# Patient Record
Sex: MALE | Race: WHITE | NOT HISPANIC OR LATINO | Employment: PART TIME | URBAN - METROPOLITAN AREA
[De-identification: names, ages, dates, MRNs, and addresses within clinical notes are randomized per-mention and may not be internally consistent; named-entity substitution may affect disease eponyms.]

---

## 2018-10-18 ENCOUNTER — HOSPITAL ENCOUNTER (EMERGENCY)
Facility: MEDICAL CENTER | Age: 28
End: 2018-10-18
Attending: EMERGENCY MEDICINE
Payer: MEDICAID

## 2018-10-18 VITALS
OXYGEN SATURATION: 98 % | SYSTOLIC BLOOD PRESSURE: 112 MMHG | TEMPERATURE: 98.1 F | HEART RATE: 59 BPM | HEIGHT: 70 IN | WEIGHT: 195.55 LBS | BODY MASS INDEX: 28 KG/M2 | DIASTOLIC BLOOD PRESSURE: 73 MMHG | RESPIRATION RATE: 16 BRPM

## 2018-10-18 DIAGNOSIS — R53.1 WEAKNESS: ICD-10-CM

## 2018-10-18 DIAGNOSIS — K62.5 BRBPR (BRIGHT RED BLOOD PER RECTUM): ICD-10-CM

## 2018-10-18 LAB
ALBUMIN SERPL BCP-MCNC: 4.4 G/DL (ref 3.2–4.9)
ALBUMIN/GLOB SERPL: 1.3 G/DL
ALP SERPL-CCNC: 61 U/L (ref 30–99)
ALT SERPL-CCNC: 31 U/L (ref 2–50)
ANION GAP SERPL CALC-SCNC: 3 MMOL/L (ref 0–11.9)
APTT PPP: 26.2 SEC (ref 24.7–36)
AST SERPL-CCNC: 25 U/L (ref 12–45)
BASOPHILS # BLD AUTO: 0.6 % (ref 0–1.8)
BASOPHILS # BLD: 0.05 K/UL (ref 0–0.12)
BILIRUB SERPL-MCNC: 0.4 MG/DL (ref 0.1–1.5)
BUN SERPL-MCNC: 25 MG/DL (ref 8–22)
CALCIUM SERPL-MCNC: 10.2 MG/DL (ref 8.5–10.5)
CHLORIDE SERPL-SCNC: 104 MMOL/L (ref 96–112)
CO2 SERPL-SCNC: 27 MMOL/L (ref 20–33)
CREAT SERPL-MCNC: 1.32 MG/DL (ref 0.5–1.4)
EOSINOPHIL # BLD AUTO: 0.19 K/UL (ref 0–0.51)
EOSINOPHIL NFR BLD: 2.3 % (ref 0–6.9)
ERYTHROCYTE [DISTWIDTH] IN BLOOD BY AUTOMATED COUNT: 42.9 FL (ref 35.9–50)
GLOBULIN SER CALC-MCNC: 3.3 G/DL (ref 1.9–3.5)
GLUCOSE SERPL-MCNC: 93 MG/DL (ref 65–99)
HCT VFR BLD AUTO: 48.5 % (ref 42–52)
HGB BLD-MCNC: 16.3 G/DL (ref 14–18)
IMM GRANULOCYTES # BLD AUTO: 0.02 K/UL (ref 0–0.11)
IMM GRANULOCYTES NFR BLD AUTO: 0.2 % (ref 0–0.9)
INR PPP: 0.96 (ref 0.87–1.13)
LYMPHOCYTES # BLD AUTO: 2.27 K/UL (ref 1–4.8)
LYMPHOCYTES NFR BLD: 27.7 % (ref 22–41)
MCH RBC QN AUTO: 29.6 PG (ref 27–33)
MCHC RBC AUTO-ENTMCNC: 33.6 G/DL (ref 33.7–35.3)
MCV RBC AUTO: 88 FL (ref 81.4–97.8)
MONOCYTES # BLD AUTO: 0.75 K/UL (ref 0–0.85)
MONOCYTES NFR BLD AUTO: 9.1 % (ref 0–13.4)
NEUTROPHILS # BLD AUTO: 4.92 K/UL (ref 1.82–7.42)
NEUTROPHILS NFR BLD: 60.1 % (ref 44–72)
NRBC # BLD AUTO: 0 K/UL
NRBC BLD-RTO: 0 /100 WBC
PLATELET # BLD AUTO: 269 K/UL (ref 164–446)
PMV BLD AUTO: 10.1 FL (ref 9–12.9)
POTASSIUM SERPL-SCNC: 4.4 MMOL/L (ref 3.6–5.5)
PROT SERPL-MCNC: 7.7 G/DL (ref 6–8.2)
PROTHROMBIN TIME: 12.9 SEC (ref 12–14.6)
RBC # BLD AUTO: 5.51 M/UL (ref 4.7–6.1)
SODIUM SERPL-SCNC: 134 MMOL/L (ref 135–145)
TSH SERPL DL<=0.005 MIU/L-ACNC: 2.26 UIU/ML (ref 0.38–5.33)
WBC # BLD AUTO: 8.2 K/UL (ref 4.8–10.8)

## 2018-10-18 PROCEDURE — 85730 THROMBOPLASTIN TIME PARTIAL: CPT

## 2018-10-18 PROCEDURE — 99284 EMERGENCY DEPT VISIT MOD MDM: CPT

## 2018-10-18 PROCEDURE — 85025 COMPLETE CBC W/AUTO DIFF WBC: CPT

## 2018-10-18 PROCEDURE — 80053 COMPREHEN METABOLIC PANEL: CPT

## 2018-10-18 PROCEDURE — 85610 PROTHROMBIN TIME: CPT

## 2018-10-18 PROCEDURE — 84443 ASSAY THYROID STIM HORMONE: CPT

## 2018-10-18 ASSESSMENT — PAIN SCALES - GENERAL: PAINLEVEL_OUTOF10: ASSUMED PAIN PRESENT

## 2018-10-18 NOTE — ED TRIAGE NOTES
"Pt ambulatory to triage c/o fatigue with generalized body aches \"for months\" but reports feeling progressively worse recently.  Pt also c/o blood in stools x 2 weeks.    \"I feel like I'm dying.\"  "

## 2018-10-18 NOTE — DISCHARGE INSTRUCTIONS
"Bloody Stools  Bloody stools often mean that there is a problem in the digestive tract. Your caregiver may use the term \"melena\" to describe black, tarry, and bad smelling stools or \"hematochezia\" to describe red or maroon-colored stools. Blood seen in the stool can be caused by bleeding anywhere along the intestinal tract.   A black stool usually means that blood is coming from the upper part of the gastrointestinal tract (esophagus, stomach, or small bowel). Passing maroon-colored stools or bright red blood usually means that blood is coming from lower down in the large bowel or the rectum. However, sometimes massive bleeding in the stomach or small intestine can cause bright red bloody stools.   Consuming black licorice, lead, iron pills, medicines containing bismuth subsalicylate, or blueberries can also cause black stools. Your caregiver can test black stools to see if blood is present.  It is important that the cause of the bleeding be found. Treatment can then be started, and the problem can be corrected. Rectal bleeding may not be serious, but you should not assume everything is okay until you know the cause. It is very important to follow up with your caregiver or a specialist in gastrointestinal problems.  CAUSES   Blood in the stools can come from various underlying causes. Often, the cause is not found during your first visit. Testing is often needed to discover the cause of bleeding in the gastrointestinal tract. Causes range from simple to serious or even life-threatening. Possible causes include:  · Hemorrhoids. These are veins that are full of blood (engorged) in the rectum. They cause pain, inflammation, and may bleed.  · Anal fissures. These are areas of painful tearing which may bleed. They are often caused by passing hard stool.  · Diverticulosis. These are pouches that form on the colon over time, with age, and may bleed significantly.  · Diverticulitis. This is inflammation in areas with " diverticulosis. It can cause pain, fever, and bloody stools, although bleeding is rare.  · Proctitis and colitis. These are inflamed areas of the rectum or colon. They may cause pain, fever, and bloody stools.  · Polyps and cancer. Colon cancer is a leading cause of preventable cancer death. It often starts out as precancerous polyps that can be removed during a colonoscopy, preventing progression into cancer. Sometimes, polyps and cancer may cause rectal bleeding.  · Gastritis and ulcers. Bleeding from the upper gastrointestinal tract (near the stomach) may travel through the intestines and produce black, sometimes tarry, often bad smelling stools. In certain cases, if the bleeding is fast enough, the stools may not be black, but red and the condition may be life-threatening.  SYMPTOMS   You may have stools that are bright red and bloody, that are normal color with blood on them, or that are dark black and tarry. In some cases, you may only have blood in the toilet bowl. Any of these cases need medical care. You may also have:  · Pain at the anus or anywhere in the rectum.  · Lightheadedness or feeling faint.  · Extreme weakness.  · Nausea or vomiting.  · Fever.  DIAGNOSIS  Your caregiver may use the following methods to find the cause of your bleeding:  · Taking a medical history. Age is important. Older people tend to develop polyps and cancer more often. If there is anal pain and a hard, large stool associated with bleeding, a tear of the anus may be the cause. If blood drips into the toilet after a bowel movement, bleeding hemorrhoids may be the problem. The color and frequency of the bleeding are additional considerations. In most cases, the medical history provides clues, but seldom the final answer.  · A visual and finger (digital) exam. Your caregiver will inspect the anal area, looking for tears and hemorrhoids. A finger exam can provide information when there is tenderness or a growth inside. In men, the  prostate is also examined.  · Endoscopy. Several types of small, long scopes (endoscopes) are used to view the colon.  · In the office, your caregiver may use a rigid, or more commonly, a flexible viewing sigmoidoscope. This exam is called flexible sigmoidoscopy. It is performed in 5 to 10 minutes.  · A more thorough exam is accomplished with a colonoscope. It allows your caregiver to view the entire 5 to 6 foot long colon. Medicine to help you relax (sedative) is usually given for this exam. Frequently, a bleeding lesion may be present beyond the reach of the sigmoidoscope. So, a colonoscopy may be the best exam to start with. Both exams are usually done on an outpatient basis. This means the patient does not stay overnight in the hospital or surgery center.  · An upper endoscopy may be needed to examine your stomach. Sedation is used and a flexible endoscope is put in your mouth, down to your stomach.  · A barium enema X-ray. This is an X-ray exam. It uses liquid barium inserted by enema into the rectum. This test alone may not identify an actual bleeding point. X-rays highlight abnormal shadows, such as those made by lumps (tumors), diverticuli, or colitis.  TREATMENT   Treatment depends on the cause of your bleeding.   · For bleeding from the stomach or colon, the caregiver doing your endoscopy or colonoscopy may be able to stop the bleeding as part of the procedure.  · Inflammation or infection of the colon can be treated with medicines.  · Many rectal problems can be treated with creams, suppositories, or warm baths.  · Surgery is sometimes needed.  · Blood transfusions are sometimes needed if you have lost a lot of blood.  · For any bleeding problem, let your caregiver know if you take aspirin or other blood thinners regularly.  HOME CARE INSTRUCTIONS   · Take any medicines exactly as prescribed.  · Keep your stools soft by eating a diet high in fiber. Prunes (1 to 3 a day) work well for many people.  · Drink  enough water and fluids to keep your urine clear or pale yellow.  · Take sitz baths if advised. A sitz bath is when you sit in a bathtub with warm water for 10 to 15 minutes to soak, soothe, and cleanse the rectal area.  · If enemas or suppositories are advised, be sure you know how to use them. Tell your caregiver if you have problems with this.  · Monitor your bowel movements to look for signs of improvement or worsening.  SEEK MEDICAL CARE IF:   · You do not improve in the time expected.  · Your condition worsens after initial improvement.  · You develop any new symptoms.  SEEK IMMEDIATE MEDICAL CARE IF:   · You develop severe or prolonged rectal bleeding.  · You vomit blood.  · You feel weak or faint.  · You have a fever.  MAKE SURE YOU:  · Understand these instructions.  · Will watch your condition.  · Will get help right away if you are not doing well or get worse.  Document Released: 12/08/2003 Document Revised: 03/11/2013 Document Reviewed: 05/04/2012  [employer]® Patient Information ©2014 [employer].      Weakness  Weakness is a lack of strength. It may be felt all over the body (generalized) or in one specific part of the body (focal). Some causes of weakness can be serious. You may need further medical evaluation, especially if you are elderly or you have a history of immunosuppression (such as chemotherapy or HIV), kidney disease, heart disease, or diabetes.  CAUSES   Weakness can be caused by many different things, including:  · Infection.  · Physical exhaustion.  · Internal bleeding or other blood loss that results in a lack of red blood cells (anemia).  · Dehydration. This cause is more common in elderly people.  · Side effects or electrolyte abnormalities from medicines, such as pain medicines or sedatives.  · Emotional distress, anxiety, or depression.  · Circulation problems, especially severe peripheral arterial disease.  · Heart disease, such as rapid atrial fibrillation, bradycardia, or heart  failure.  · Nervous system disorders, such as Guillain-Barré syndrome, multiple sclerosis, or stroke.  DIAGNOSIS   To find the cause of your weakness, your caregiver will take your history and perform a physical exam. Lab tests or X-rays may also be ordered, if needed.  TREATMENT   Treatment of weakness depends on the cause of your symptoms and can vary greatly.  HOME CARE INSTRUCTIONS   · Rest as needed.  · Eat a well-balanced diet.  · Try to get some exercise every day.  · Only take over-the-counter or prescription medicines as directed by your caregiver.  SEEK MEDICAL CARE IF:   · Your weakness seems to be getting worse or spreads to other parts of your body.  · You develop new aches or pains.  SEEK IMMEDIATE MEDICAL CARE IF:   · You cannot perform your normal daily activities, such as getting dressed and feeding yourself.  · You cannot walk up and down stairs, or you feel exhausted when you do so.  · You have shortness of breath or chest pain.  · You have difficulty moving parts of your body.  · You have weakness in only one area of the body or on only one side of the body.  · You have a fever.  · You have trouble speaking or swallowing.  · You cannot control your bladder or bowel movements.  · You have black or bloody vomit or stools.  MAKE SURE YOU:  · Understand these instructions.  · Will watch your condition.  · Will get help right away if you are not doing well or get worse.  This information is not intended to replace advice given to you by your health care provider. Make sure you discuss any questions you have with your health care provider.  Document Released: 12/18/2006 Document Revised: 06/18/2013 Document Reviewed: 10/07/2016  [employer] Interactive Patient Education © 2017 [employer]

## 2018-10-18 NOTE — ED NOTES
Patient was educated on discharge instructions.  Patient was informed about diagnosis, symptom management, risks, and home care instructions.  Patient verbalized understanding and signed discharge instructions.Copy of discharge instructions in chart.  Patient ambulated out with steady gait.  Patient has personal belongings and PIV removed, tip intact.

## 2018-10-31 ENCOUNTER — HOSPITAL ENCOUNTER (OUTPATIENT)
Dept: LAB | Facility: MEDICAL CENTER | Age: 28
End: 2018-10-31
Attending: INTERNAL MEDICINE
Payer: MEDICAID

## 2018-10-31 ENCOUNTER — OFFICE VISIT (OUTPATIENT)
Dept: MEDICAL GROUP | Facility: MEDICAL CENTER | Age: 28
End: 2018-10-31
Attending: INTERNAL MEDICINE
Payer: MEDICAID

## 2018-10-31 VITALS
TEMPERATURE: 97.5 F | SYSTOLIC BLOOD PRESSURE: 110 MMHG | BODY MASS INDEX: 26.46 KG/M2 | DIASTOLIC BLOOD PRESSURE: 60 MMHG | HEART RATE: 80 BPM | HEIGHT: 71 IN | RESPIRATION RATE: 18 BRPM | OXYGEN SATURATION: 96 % | WEIGHT: 189 LBS

## 2018-10-31 DIAGNOSIS — R21 RASH: ICD-10-CM

## 2018-10-31 DIAGNOSIS — R53.82 CHRONIC FATIGUE: ICD-10-CM

## 2018-10-31 DIAGNOSIS — M79.10 MUSCLE SORENESS: ICD-10-CM

## 2018-10-31 DIAGNOSIS — F10.21 ALCOHOLISM IN REMISSION (HCC): ICD-10-CM

## 2018-10-31 DIAGNOSIS — K92.1 HEMATOCHEZIA: ICD-10-CM

## 2018-10-31 LAB — ERYTHROCYTE [SEDIMENTATION RATE] IN BLOOD BY WESTERGREN METHOD: 6 MM/HOUR (ref 0–15)

## 2018-10-31 PROCEDURE — 99202 OFFICE O/P NEW SF 15 MIN: CPT | Performed by: INTERNAL MEDICINE

## 2018-10-31 PROCEDURE — 82550 ASSAY OF CK (CPK): CPT

## 2018-10-31 PROCEDURE — 87389 HIV-1 AG W/HIV-1&-2 AB AG IA: CPT

## 2018-10-31 PROCEDURE — 86140 C-REACTIVE PROTEIN: CPT

## 2018-10-31 PROCEDURE — 36415 COLL VENOUS BLD VENIPUNCTURE: CPT

## 2018-10-31 PROCEDURE — 85652 RBC SED RATE AUTOMATED: CPT

## 2018-10-31 PROCEDURE — 80074 ACUTE HEPATITIS PANEL: CPT

## 2018-10-31 PROCEDURE — 99204 OFFICE O/P NEW MOD 45 MIN: CPT | Performed by: INTERNAL MEDICINE

## 2018-10-31 PROCEDURE — 86038 ANTINUCLEAR ANTIBODIES: CPT

## 2018-10-31 ASSESSMENT — PATIENT HEALTH QUESTIONNAIRE - PHQ9: CLINICAL INTERPRETATION OF PHQ2 SCORE: 0

## 2018-10-31 NOTE — ASSESSMENT & PLAN NOTE
"Patient reports increased fatigue over the past several months.  He complains of waking up feeling \"heavy\" in the morning.  He states that it takes him a few hours to get going.  Once he goes to the gym and exercises, he feels better for about an hour before his symptoms return.  Associated symptoms include muscle soreness, intermittent itching and burning of his skin, and intermittent insomnia.  He denies snoring, PND, orthopnea.  He denies fevers, chills, weight loss, lumps or bumps in his neck/groin/armpit regions.  He has struggled with depression in the past but denies currently feeling depressed.  He is not having any joint pain or swelling.  He has no significant past medical history.  He is not taking any medicines.  He uses creatine, fish oil, and biotin intermittently but not regularly.  He states that at night he usually sleeps 5-8 hours.  He will have about 1-2 nights per week where he is not able to sleep well but this is chronic for him.    Patient was seen in the emergency room for this issue on 10/18/18.  Basic labs were ordered including a CBC, CMP, and thyroid.  Everything came back within normal limits.  "

## 2018-10-31 NOTE — ASSESSMENT & PLAN NOTE
He reports for the past year off and on he has had intermittent bright red blood in his bowel movements.  For the past several months, it has been nearly every other bowel movement.  He usually has 1 bowel movement per day.  He reports some mild straining and hard stools.  He will have some mild discomfort with some bowel movements but not all the time.  He reports there is usually blood on the toilet paper, in the toilet bowl, and surrounding the stool.  He denies diarrhea or abdominal pain.  He denies heartburn.  No melena.

## 2018-10-31 NOTE — ASSESSMENT & PLAN NOTE
He reports a history of heavy alcohol use but has been sober for 2 years.  He states that he was drinking 1/5 of hard alcohol daily for about 2-3 years.

## 2018-10-31 NOTE — ASSESSMENT & PLAN NOTE
Patient reports diffuse muscle soreness throughout his body.  He states that he works out daily with some cardio and weight lifting.  He denies joint pain.

## 2018-10-31 NOTE — ASSESSMENT & PLAN NOTE
"He has noticed a hypopigmented rash over both of his arms that started about 2 years ago.  He states that he initially paid out of pocket to see a dermatologist who did a skin biopsy.  His understanding was that there was no skin issue and \"there was something wrong with my circulation\".  He will not disclose the name of the dermatologist who he saw because he says he prefers us not to contact their office since he still has an outstanding bill.  He thinks that the rash might get a little worse in the sun.  He has noticed it on his legs before.  He also reports areas of acne that he has had do not heal as quickly.  "

## 2018-10-31 NOTE — PROGRESS NOTES
"Riley Doll is a 28 y.o. male here for worsening fatigue and muscle soreness, bright red blood in bowel movements, establish care  HPI: No previous PCP  Chronic fatigue  Patient reports increased fatigue over the past several months.  He complains of waking up feeling \"heavy\" in the morning.  He states that it takes him a few hours to get going.  Once he goes to the gym and exercises, he feels better for about an hour before his symptoms return.  Associated symptoms include muscle soreness, intermittent itching and burning of his skin, and intermittent insomnia.  He denies snoring, PND, orthopnea.  He denies fevers, chills, weight loss, lumps or bumps in his neck/groin/armpit regions.  He has struggled with depression in the past but denies currently feeling depressed.  He is not having any joint pain or swelling.  He has no significant past medical history.  He is not taking any medicines.  He uses creatine, fish oil, and biotin intermittently but not regularly.  He states that at night he usually sleeps 5-8 hours.  He will have about 1-2 nights per week where he is not able to sleep well but this is chronic for him.    Patient was seen in the emergency room for this issue on 10/18/18.  Basic labs were ordered including a CBC, CMP, and thyroid.  Everything came back within normal limits.    Muscle soreness  Patient reports diffuse muscle soreness throughout his body.  He states that he works out daily with some cardio and weight lifting.  He denies joint pain.      Hematochezia  He reports for the past year off and on he has had intermittent bright red blood in his bowel movements.  For the past several months, it has been nearly every other bowel movement.  He usually has 1 bowel movement per day.  He reports some mild straining and hard stools.  He will have some mild discomfort with some bowel movements but not all the time.  He reports there is usually blood on the toilet paper, in the toilet bowl, and " "surrounding the stool.  He denies diarrhea or abdominal pain.  He denies heartburn.  No melena.      Rash  He has noticed a hypopigmented rash over both of his arms that started about 2 years ago.  He states that he initially paid out of pocket to see a dermatologist who did a skin biopsy.  His understanding was that there was no skin issue and \"there was something wrong with my circulation\".  He will not disclose the name of the dermatologist who he saw because he says he prefers us not to contact their office since he still has an outstanding bill.  He thinks that the rash might get a little worse in the sun.  He has noticed it on his legs before.  He also reports areas of acne that he has had do not heal as quickly.    Alcoholism in remission (HCC)  He reports a history of heavy alcohol use but has been sober for 2 years.  He states that he was drinking 1/5 of hard alcohol daily for about 2-3 years.    Current medicines (including changes today)  No current outpatient prescriptions on file.     No current facility-administered medications for this visit.      He  has no past medical history on file.  He  has a past surgical history that includes vocal cord polyp excision.  Social History   Substance Use Topics   • Smoking status: Former Smoker     Years: 3.00     Types: Cigarettes     Quit date: 10/31/2014   • Smokeless tobacco: Never Used      Comment: 1 pack every 2 weeks   • Alcohol use No      Comment: 3-4 days per week fifth per day x 3 years, quit 2014     Social History     Social History Narrative   • No narrative on file     Family History   Problem Relation Age of Onset   • Alcohol/Drug Mother    • Kidney Disease Father    • Cancer Paternal Grandmother         colon         ROS  Reports reduced libido times 6 months and difficulty with erections  Denies chest pain, shortness of breath  As above in HPI  All other systems reviewed and are negative     Objective:     Blood pressure 110/60, pulse 80, " "temperature 36.4 °C (97.5 °F), temperature source Temporal, resp. rate 18, height 1.791 m (5' 10.5\"), weight 85.7 kg (189 lb), SpO2 96 %. Body mass index is 26.74 kg/m².  Physical Exam:    Constitutional: Alert, no distress, very muscular stature.  Skin: Warm, dry, good turgor, scattered small hypopigmented patches over bilateral forearms, no obvious rash on chest or back, several small acneform lesions on upper torso.  Eye: Equal, round and reactive, conjunctiva clear, lids normal.  ENMT: Lips without lesions, good dentition, oropharynx clear, TM's clear bilaterally.  Neck: Trachea midline, no masses, no thyromegaly. No cervical or supraclavicular lymphadenopathy.  Respiratory: Unlabored respiratory effort, lungs clear to auscultation, no wheezes, no ronchi.  Cardiovascular: Regular rate and rhythm, no murmurs appreciated, no lower extremity edema.  Abdomen: Soft, non-tender, no masses, no hepatosplenomegaly.  Psych: Alert and oriented x3, normal affect and mood.    Lab Results   Component Value Date/Time    WBC 8.2 10/18/2018 12:54 PM    RBC 5.51 10/18/2018 12:54 PM    HEMOGLOBIN 16.3 10/18/2018 12:54 PM    HEMATOCRIT 48.5 10/18/2018 12:54 PM    MCV 88.0 10/18/2018 12:54 PM    MCH 29.6 10/18/2018 12:54 PM    MCHC 33.6 (L) 10/18/2018 12:54 PM    MPV 10.1 10/18/2018 12:54 PM    NEUTSPOLYS 60.10 10/18/2018 12:54 PM    LYMPHOCYTES 27.70 10/18/2018 12:54 PM    MONOCYTES 9.10 10/18/2018 12:54 PM    EOSINOPHILS 2.30 10/18/2018 12:54 PM    BASOPHILS 0.60 10/18/2018 12:54 PM         Ref. Range 10/18/2018 12:54   Sodium Latest Ref Range: 135 - 145 mmol/L 134 (L)   Potassium Latest Ref Range: 3.6 - 5.5 mmol/L 4.4   Chloride Latest Ref Range: 96 - 112 mmol/L 104   Co2 Latest Ref Range: 20 - 33 mmol/L 27   Anion Gap Latest Ref Range: 0.0 - 11.9  3.0   Glucose Latest Ref Range: 65 - 99 mg/dL 93   Bun Latest Ref Range: 8 - 22 mg/dL 25 (H)   Creatinine Latest Ref Range: 0.50 - 1.40 mg/dL 1.32   GFR If  Latest Ref " Range: >60 mL/min/1.73 m 2 >60   GFR If Non  Latest Ref Range: >60 mL/min/1.73 m 2 >60   Calcium Latest Ref Range: 8.5 - 10.5 mg/dL 10.2   AST(SGOT) Latest Ref Range: 12 - 45 U/L 25   ALT(SGPT) Latest Ref Range: 2 - 50 U/L 31   Alkaline Phosphatase Latest Ref Range: 30 - 99 U/L 61   Total Bilirubin Latest Ref Range: 0.1 - 1.5 mg/dL 0.4   Albumin Latest Ref Range: 3.2 - 4.9 g/dL 4.4   Total Protein Latest Ref Range: 6.0 - 8.2 g/dL 7.7   Globulin Latest Ref Range: 1.9 - 3.5 g/dL 3.3   A-G Ratio Latest Units: g/dL 1.3   PT Latest Ref Range: 12.0 - 14.6 sec 12.9   INR Latest Ref Range: 0.87 - 1.13  0.96   APTT Latest Ref Range: 24.7 - 36.0 sec 26.2   TSH Latest Ref Range: 0.380 - 5.330 uIU/mL 2.260       Assessment and Plan:   The following treatment plan was discussed    1. Chronic fatigue  Unclear etiology at this point.  His workup to date includes a normal CBC, TSH, and CMP.  We will add on additional labs as below for further investigation.  He currently denies depression and screens negative for sleep apnea.  - HIV AG/AB COMBO ASSAY SCREENING; Future  - HEPATITIS PANEL ACUTE(4 COMPONENTS); Future  - JOHN REFLEXIVE PROFILE; Future  - WESTERGREN SED RATE; Future  - CRP QUANTITIVE (NON-CARDIAC); Future    2. Muscle soreness  Again, etiology is unclear.  He denies any illicit substance abuse or testosterone or anabolic steroid use.  - CREATINE KINASE; Future    3. Hematochezia  Differential would include hemorrhoidal bleeding, anal fissure, inflammatory bowel disease, less likely diverticular bleed given young age, unlikely upper GI bleed.  I have referred him to GI for further investigation.  Rectal exam deferred as he prefers to follow-up with GI.  ER precautions given for extensive bleeding, painful bleeding.  - REFERRAL TO GASTROENTEROLOGY    4. Rash  May represent a tinea versicolor.  Does not appear like vitiligo.  We could consider a skin biopsy in the future if labs remain unrevealing.  I am not  sure why his skin is so sensitive.  -Follow-up above blood work, consider skin biopsy if unrevealing    5. Alcoholism in remission (HCC)  Stable, last drink was 2 years ago.  We will continue to monitor.        Followup: Return in about 4 weeks (around 11/28/2018), or if symptoms worsen or fail to improve.  We will determine a plan for follow-up/follow-up interview based on lab results

## 2018-11-01 LAB
CK SERPL-CCNC: 478 U/L (ref 0–154)
CRP SERPL HS-MCNC: 0.16 MG/DL (ref 0–0.75)
HAV IGM SERPL QL IA: NEGATIVE
HBV CORE IGM SER QL: NEGATIVE
HBV SURFACE AG SER QL: NEGATIVE
HCV AB SER QL: NEGATIVE
HIV 1+2 AB+HIV1 P24 AG SERPL QL IA: NON REACTIVE

## 2018-11-03 LAB — NUCLEAR IGG SER QL IA: NORMAL

## 2018-11-05 ENCOUNTER — TELEPHONE (OUTPATIENT)
Dept: MEDICAL GROUP | Facility: MEDICAL CENTER | Age: 28
End: 2018-11-05

## 2018-11-05 NOTE — TELEPHONE ENCOUNTER
1. Name: ALEXANDRE FAGAN      Call Back Number: [phone]  Patient approves a detailed voicemail message: yes    Alexandre is in need of his lab results. Could you please call him to give those to him.

## 2018-11-06 ENCOUNTER — TELEPHONE (OUTPATIENT)
Dept: MEDICAL GROUP | Facility: MEDICAL CENTER | Age: 28
End: 2018-11-06

## 2018-11-06 DIAGNOSIS — M79.10 MYALGIA: ICD-10-CM

## 2018-11-15 ENCOUNTER — HOSPITAL ENCOUNTER (OUTPATIENT)
Dept: LAB | Facility: MEDICAL CENTER | Age: 28
End: 2018-11-15
Attending: FAMILY MEDICINE
Payer: MEDICAID

## 2018-11-15 DIAGNOSIS — M79.10 MYALGIA: ICD-10-CM

## 2018-11-15 PROCEDURE — 82085 ASSAY OF ALDOLASE: CPT

## 2018-11-15 PROCEDURE — 82550 ASSAY OF CK (CPK): CPT

## 2018-11-15 PROCEDURE — 82552 ASSAY OF CPK IN BLOOD: CPT

## 2018-11-15 PROCEDURE — 36415 COLL VENOUS BLD VENIPUNCTURE: CPT

## 2018-11-17 LAB — ALDOLASE SERPL-CCNC: 5.4 U/L (ref 1.5–8.1)

## 2018-11-18 LAB
CK BB CFR SERPL ELPH: 0 % (ref 0–0)
CK MACRO1 CFR SERPL: 0 % (ref 0–0)
CK MACRO2 CFR SERPL: 0 % (ref 0–0)
CK MB CFR SERPL ELPH: 0 % (ref 0–4)
CK MM CFR SERPL ELPH: 100 % (ref 96–100)
CK SERPL-CCNC: 218 U/L (ref 20–200)

## 2018-11-21 ENCOUNTER — TELEPHONE (OUTPATIENT)
Dept: MEDICAL GROUP | Facility: MEDICAL CENTER | Age: 28
End: 2018-11-21

## 2018-11-21 NOTE — TELEPHONE ENCOUNTER
Phone Number Called: [phone] (home)     Message: Called pt. Left message to call back regarding results.    Left Message for patient to call back: yes

## 2018-11-21 NOTE — TELEPHONE ENCOUNTER
----- Message from Edenilson Gloria M.D. sent at 11/20/2018  6:35 PM PST -----  Recent test of muscle enzymes show near complete normalization of the CPK and the additional test we did this time of aldolase was normal.  My interpretation is that this shows no current active muscle inflammation.  If further symptoms are recurring suggest you contact your regular PCP, Dr. Truong.

## 2018-11-22 NOTE — TELEPHONE ENCOUNTER
Pt came into office to get result notes. Scheduled an appointment with pt's PCP as symptom's are still occurring.

## 2018-11-28 ENCOUNTER — OFFICE VISIT (OUTPATIENT)
Dept: MEDICAL GROUP | Facility: MEDICAL CENTER | Age: 28
End: 2018-11-28
Attending: INTERNAL MEDICINE
Payer: MEDICAID

## 2018-11-28 VITALS
BODY MASS INDEX: 25.48 KG/M2 | TEMPERATURE: 98.5 F | RESPIRATION RATE: 16 BRPM | SYSTOLIC BLOOD PRESSURE: 120 MMHG | HEART RATE: 78 BPM | WEIGHT: 182 LBS | DIASTOLIC BLOOD PRESSURE: 78 MMHG | HEIGHT: 71 IN | OXYGEN SATURATION: 99 %

## 2018-11-28 DIAGNOSIS — F51.01 PRIMARY INSOMNIA: ICD-10-CM

## 2018-11-28 DIAGNOSIS — F33.1 MODERATE EPISODE OF RECURRENT MAJOR DEPRESSIVE DISORDER (HCC): ICD-10-CM

## 2018-11-28 DIAGNOSIS — R21 RASH: ICD-10-CM

## 2018-11-28 DIAGNOSIS — R53.82 CHRONIC FATIGUE: ICD-10-CM

## 2018-11-28 PROBLEM — F41.9 ANXIETY: Status: ACTIVE | Noted: 2018-11-28

## 2018-11-28 PROBLEM — G47.00 INSOMNIA: Status: ACTIVE | Noted: 2018-11-28

## 2018-11-28 PROCEDURE — 99214 OFFICE O/P EST MOD 30 MIN: CPT | Performed by: INTERNAL MEDICINE

## 2018-11-28 PROCEDURE — 99213 OFFICE O/P EST LOW 20 MIN: CPT | Performed by: INTERNAL MEDICINE

## 2018-11-28 RX ORDER — TRAZODONE HYDROCHLORIDE 100 MG/1
TABLET ORAL
Qty: 10 TAB | Refills: 0 | Status: SHIPPED | OUTPATIENT
Start: 2018-11-28 | End: 2018-12-04 | Stop reason: SDUPTHER

## 2018-11-28 ASSESSMENT — PAIN SCALES - GENERAL: PAINLEVEL: NO PAIN

## 2018-11-28 NOTE — PROGRESS NOTES
Subjective:   Riley Doll is a 28 y.o. male here today for follow-up fatigue and rash on arms, follow-up lab work    Insomnia  He continues to report a lot of difficulty with sleep.  States that he is getting 6-7 hours of sleep at night with quite a few interruptions.  He has difficulty falling asleep and usually takes at least an hour for him to do so.  He wakes up numerous times at night for unknown reasons.  On average, he thinks he is up at least 4 times.  He continues to report fatigue during the day.  States that he has had issues with sleep for a long time but they seem worse lately.  His father takes trazodone which helps.  Patient has tried over-the-counter melatonin and a generic sleep aid which worked some of the time but not consistently.    Moderate episode of recurrent major depressive disorder (HCC)  He reports that his depression has been worsening lately.  He has never been treated for depression in the past.  Currently denies suicidal ideation.  Endorses anhedonia, has stopped going to the gym for the past month, has decreased appetite and has lost about 8 pounds.  Continues to complain of fatigue.    Rash  Reports persistent rash over bilateral arms.  He states that his skin feels itchy and burning at times.  This also makes it hard for him to sleep through the night.  No change in the rash since his last visit.    Chronic fatigue  He continues to reports persistent fatigue.  At his last visit, we obtained blood work including testing for hepatitis, HIV, ESR, CRP, JOHN, CK.  All of the tests came back unremarkable except for a mildly elevated CK which on repeat had come down and is likely related to his workouts.  Prior to this, he had had normal CBC, CMP, and thyroid testing.  He continues to have sleeping issues as below and depression as below.       Current medicines (including changes today)  Current Outpatient Prescriptions   Medication Sig Dispense Refill   • traZODone (DESYREL) 100 MG  "Tab Take 1/2 to 1 full tab as needed for sleep 10 Tab 0     No current facility-administered medications for this visit.      He  has a past medical history of Depression; Insomnia; and Substance abuse (HCC).    ROS   As above in HPI     Objective:     Blood pressure 120/78, pulse 78, temperature 36.9 °C (98.5 °F), temperature source Temporal, resp. rate 16, height 1.791 m (5' 10.51\"), weight 82.6 kg (182 lb), SpO2 99 %. Body mass index is 25.74 kg/m².   Physical Exam:  Constitutional: Alert, no distress.  Skin: Warm, dry, good turgor, hypopigmented patches over bilateral forearms unchanged from previous.  Eye: Equal, round and reactive, conjunctiva clear, lids normal.  Psych: Alert and oriented x3, flat affect, depressed mood.      Results and Imaging:      Ref. Range 10/31/2018 10:22 11/15/2018 15:20   Sed Rate Westergren Latest Ref Range: 0 - 15 mm/hour 6    CPK Total Latest Ref Range: 20 - 200 U/L 478 (H) 218 (H)   Aldolase Latest Ref Range: 1.5 - 8.1 U/L  5.4   CK Macro Type 2 Latest Ref Range: 0 - 0 %  0   CPK3 Mm Latest Ref Range: 96 - 100 %  100   CPK2 Mb Latest Ref Range: 0 - 4 %  0   CK Macro Type 1 Latest Ref Range: 0 - 0 %  0   CPK1 Bb Latest Ref Range: 0 - 0 %  0   Hepatitis A Virus Ab, IgM Latest Ref Range: Negative  Negative    Hepatitis B Surface Antigen Latest Ref Range: Negative  Negative    Hepatitis B Cors Ab,IgM Latest Ref Range: Negative  Negative    Hepatitis C Antibody Latest Ref Range: Negative  Negative    HIV Ag/Ab Combo Assay Latest Ref Range: Non Reactive  Non Reactive    Stat C-Reactive Protein Latest Ref Range: 0.00 - 0.75 mg/dL 0.16    Antinuclear Antibody Latest Ref Range: None Detected  None Detected        Assessment and Plan:   The following treatment plan was discussed    1. Chronic fatigue  With an almost completely normal workup to date, I think that this is more related to depression and difficulty sleeping.  Discussed with the patient that we will target these 2 areas to see " how much his fatigue improves.  If it still persists despite good control of his sleep and depression then we can continue to search for alternative etiologies.    2. Moderate episode of recurrent major depressive disorder (HCC)  Uncontrolled.  He prefers to stay off of medication at this time and get established with a therapist.  I have placed a referral today.  We discussed that if no improvement in symptoms while working with a therapist that medications would also be an option.  - REFERRAL TO PSYCHOLOGY  -f/u 4 weeks for reassesment     3. Primary insomnia  Uncontrolled.  We will try him on trazodone as below.  If effective we will continue to refill  - traZODone (DESYREL) 100 MG Tab; Take 1/2 to 1 full tab as needed for sleep  Dispense: 10 Tab; Refill: 0    4. Rash  Patient will return next week for a skin biopsy as I am not sure the exact etiology of this rash.            Followup: Return in about 6 days (around 12/4/2018) for skin biopsy, f/u insomnia.

## 2018-11-28 NOTE — ASSESSMENT & PLAN NOTE
He continues to report a lot of difficulty with sleep.  States that he is getting 6-7 hours of sleep at night with quite a few interruptions.  He has difficulty falling asleep and usually takes at least an hour for him to do so.  He wakes up numerous times at night for unknown reasons.  On average, he thinks he is up at least 4 times.  He continues to report fatigue during the day.  States that he has had issues with sleep for a long time but they seem worse lately.  His father takes trazodone which helps.  Patient has tried over-the-counter melatonin and a generic sleep aid which worked some of the time but not consistently.

## 2018-11-28 NOTE — ASSESSMENT & PLAN NOTE
Reports persistent rash over bilateral arms.  He states that his skin feels itchy and burning at times.  This also makes it hard for him to sleep through the night.  No change in the rash since his last visit.

## 2018-11-28 NOTE — ASSESSMENT & PLAN NOTE
He reports that his depression has been worsening lately.  He has never been treated for depression in the past.  Currently denies suicidal ideation.  Endorses anhedonia, has stopped going to the gym for the past month, has decreased appetite and has lost about 8 pounds.  Continues to complain of fatigue.

## 2018-11-28 NOTE — ASSESSMENT & PLAN NOTE
He continues to reports persistent fatigue.  At his last visit, we obtained blood work including testing for hepatitis, HIV, ESR, CRP, JOHN, CK.  All of the tests came back unremarkable except for a mildly elevated CK which on repeat had come down and is likely related to his workouts.  Prior to this, he had had normal CBC, CMP, and thyroid testing.  He continues to have sleeping issues as below and depression as below.

## 2018-12-04 ENCOUNTER — OFFICE VISIT (OUTPATIENT)
Dept: MEDICAL GROUP | Facility: MEDICAL CENTER | Age: 28
End: 2018-12-04
Attending: INTERNAL MEDICINE
Payer: MEDICAID

## 2018-12-04 ENCOUNTER — HOSPITAL ENCOUNTER (OUTPATIENT)
Facility: MEDICAL CENTER | Age: 28
End: 2018-12-04
Attending: INTERNAL MEDICINE
Payer: MEDICAID

## 2018-12-04 VITALS
RESPIRATION RATE: 16 BRPM | SYSTOLIC BLOOD PRESSURE: 122 MMHG | OXYGEN SATURATION: 95 % | HEIGHT: 71 IN | DIASTOLIC BLOOD PRESSURE: 78 MMHG | TEMPERATURE: 98.1 F | HEART RATE: 82 BPM | BODY MASS INDEX: 25.48 KG/M2 | WEIGHT: 182 LBS

## 2018-12-04 DIAGNOSIS — F33.1 MODERATE EPISODE OF RECURRENT MAJOR DEPRESSIVE DISORDER (HCC): ICD-10-CM

## 2018-12-04 DIAGNOSIS — R21 RASH: ICD-10-CM

## 2018-12-04 DIAGNOSIS — F51.01 PRIMARY INSOMNIA: ICD-10-CM

## 2018-12-04 LAB — PATHOLOGY CONSULT NOTE: NORMAL

## 2018-12-04 PROCEDURE — 88305 TISSUE EXAM BY PATHOLOGIST: CPT

## 2018-12-04 PROCEDURE — 11100 PR BIOPSY OF SKIN LESION: CPT | Performed by: INTERNAL MEDICINE

## 2018-12-04 PROCEDURE — 99213 OFFICE O/P EST LOW 20 MIN: CPT | Performed by: INTERNAL MEDICINE

## 2018-12-04 PROCEDURE — 99213 OFFICE O/P EST LOW 20 MIN: CPT | Mod: 25 | Performed by: INTERNAL MEDICINE

## 2018-12-04 RX ORDER — TRAZODONE HYDROCHLORIDE 100 MG/1
100 TABLET ORAL NIGHTLY PRN
Qty: 30 TAB | Refills: 5 | Status: SHIPPED | OUTPATIENT
Start: 2018-12-04 | End: 2020-02-12 | Stop reason: SDUPTHER

## 2018-12-04 ASSESSMENT — PAIN SCALES - GENERAL: PAINLEVEL: 5=MODERATE PAIN

## 2018-12-04 NOTE — ASSESSMENT & PLAN NOTE
He presents today for skin biopsy.  He continues to have the rash over both of his forearms.  It is blanching, and it tends to subside when he is working out but then comes back.  His skin feels itchy and sometimes burns.  He denies any blisters or sloughing of the skin.  He denies fevers and chills.

## 2018-12-04 NOTE — ASSESSMENT & PLAN NOTE
He reports improvement in his sleep with the trazodone.  He has had to take the 100 mg at night and has done well sleeping through the night.  He is requesting refills today.

## 2018-12-04 NOTE — ASSESSMENT & PLAN NOTE
He has received the information for [employer] to set up an appointment for therapy as discussed at our last visit but has not done so yet.

## 2018-12-04 NOTE — PROGRESS NOTES
"Subjective:   Riley Doll is a 28 y.o. male here today for skin biopsy, follow up insomnia and depression    Rash  He presents today for skin biopsy.  He continues to have the rash over both of his forearms.  It is blanching, and it tends to subside when he is working out but then comes back.  His skin feels itchy and sometimes burns.  He denies any blisters or sloughing of the skin.  He denies fevers and chills.    Insomnia  He reports improvement in his sleep with the trazodone.  He has had to take the 100 mg at night and has done well sleeping through the night.  He is requesting refills today.    Moderate episode of recurrent major depressive disorder (HCC)  He has received the information for [facility] to set up an appointment for therapy as discussed at our last visit but has not done so yet.       Current medicines (including changes today)  Current Outpatient Prescriptions   Medication Sig Dispense Refill   • traZODone (DESYREL) 100 MG Tab Take 1 Tab by mouth at bedtime as needed for Sleep. 30 Tab 5     No current facility-administered medications for this visit.      He  has a past medical history of Depression; Insomnia; and Substance abuse (HCC).    ROS   As above in HPI     Objective:     Blood pressure 122/78, pulse 82, temperature 36.7 °C (98.1 °F), temperature source Temporal, resp. rate 16, height 1.791 m (5' 10.51\"), weight 82.6 kg (182 lb), SpO2 95 %. Body mass index is 25.74 kg/m².   Physical Exam:  Constitutional: Alert, no distress.  Skin: Warm, dry, good turgor, hypopigmented irregular macular lesions over bilateral forearms which are blanching, average size about 4 mm  Eye: Equal, round and reactive, conjunctiva clear, lids normal.  Psych: Alert and oriented x3, normal affect and mood.    Procedure note:  Punch biopsy procedure explained to patient including risks and benefits, patient agreed to proceed. Area of skin over left forearm selected for biopsy. Area was prepped with alcohol pad. " It was then numbed using about 0.5 cc of 1% Xylocaine with epi. A 4 mm punch was used to obtain a biopsy. This was then sent for pathology. Hemostasis was achieved with gauze and one application of silver nitrate and no sutures were placed. The area was dressed with a Band-Aid. The patient tolerated the procedure well without complications.      Assessment and Plan:   The following treatment plan was discussed    1. Primary insomnia  Stable, improved with trazodone which I refilled today.  Discussed that he can use this as needed or nightly depending on his symptoms.  - traZODone (DESYREL) 100 MG Tab; Take 1 Tab by mouth at bedtime as needed for Sleep.  Dispense: 30 Tab; Refill: 5    2. Rash  Biopsy done today.  Will contact patient with results.  - PATHOLOGY SPECIMEN; Future    3. Moderate episode of recurrent major depressive disorder (HCC)  We reviewed his referral to psychology and he will schedule an appointment in the near future.        Followup: Return if symptoms worsen or fail to improve.

## 2018-12-07 ENCOUNTER — TELEPHONE (OUTPATIENT)
Dept: MEDICAL GROUP | Facility: MEDICAL CENTER | Age: 28
End: 2018-12-07

## 2018-12-07 NOTE — TELEPHONE ENCOUNTER
----- Message from Alba Truong M.D. sent at 12/6/2018  4:15 PM PST -----  Please let patient know that his skin biopsy came back completely normal.  There was no evidence of inflammation or other abnormal processes on a microscopic scale.    Alba Truong M.D.

## 2018-12-07 NOTE — TELEPHONE ENCOUNTER
Phone Number Called: [phone] (home)     Message: Called pt. Left a message to call back regarding Pathology results.    Left Message for patient to call back: yes

## 2018-12-27 ENCOUNTER — OFFICE VISIT (OUTPATIENT)
Dept: MEDICAL GROUP | Facility: MEDICAL CENTER | Age: 28
End: 2018-12-27
Attending: INTERNAL MEDICINE
Payer: MEDICAID

## 2018-12-27 VITALS
OXYGEN SATURATION: 98 % | SYSTOLIC BLOOD PRESSURE: 118 MMHG | HEART RATE: 72 BPM | WEIGHT: 181 LBS | BODY MASS INDEX: 25.34 KG/M2 | DIASTOLIC BLOOD PRESSURE: 72 MMHG | TEMPERATURE: 98.2 F | RESPIRATION RATE: 16 BRPM | HEIGHT: 71 IN

## 2018-12-27 DIAGNOSIS — R23.1 LIVEDO RETICULARIS: ICD-10-CM

## 2018-12-27 PROCEDURE — 99214 OFFICE O/P EST MOD 30 MIN: CPT | Performed by: INTERNAL MEDICINE

## 2018-12-27 PROCEDURE — 99213 OFFICE O/P EST LOW 20 MIN: CPT | Performed by: INTERNAL MEDICINE

## 2018-12-27 ASSESSMENT — PATIENT HEALTH QUESTIONNAIRE - PHQ9
8. MOVING OR SPEAKING SO SLOWLY THAT OTHER PEOPLE COULD HAVE NOTICED. OR THE OPPOSITE, BEING SO FIGETY OR RESTLESS THAT YOU HAVE BEEN MOVING AROUND A LOT MORE THAN USUAL: NOT AT ALL
1. LITTLE INTEREST OR PLEASURE IN DOING THINGS: NOT AT ALL
6. FEELING BAD ABOUT YOURSELF - OR THAT YOU ARE A FAILURE OR HAVE LET YOURSELF OR YOUR FAMILY DOWN: NOT AL ALL
2. FEELING DOWN, DEPRESSED, IRRITABLE, OR HOPELESS: NOT AT ALL
SUM OF ALL RESPONSES TO PHQ9 QUESTIONS 1 AND 2: 0
3. TROUBLE FALLING OR STAYING ASLEEP OR SLEEPING TOO MUCH: NOT AT ALL
4. FEELING TIRED OR HAVING LITTLE ENERGY: NOT AT ALL
7. TROUBLE CONCENTRATING ON THINGS, SUCH AS READING THE NEWSPAPER OR WATCHING TELEVISION: NOT AT ALL
9. THOUGHTS THAT YOU WOULD BE BETTER OFF DEAD, OR OF HURTING YOURSELF: NOT AT ALL
SUM OF ALL RESPONSES TO PHQ QUESTIONS 1-9: 0
5. POOR APPETITE OR OVEREATING: NOT AT ALL

## 2018-12-27 ASSESSMENT — PAIN SCALES - GENERAL: PAINLEVEL: NO PAIN

## 2018-12-27 NOTE — ASSESSMENT & PLAN NOTE
Presents for follow up skin biopsy, which came back normal.  Continues to have the rash on both arms, legs, and he states it is also on his trunk.  He reports that it gets worse when he is cold and improves when he warms up.  It only disappears when he works out.  In the past, he was told this could be a circulation issue and he has already seen a dermatologist for it several years ago who also did a skin biopsy that came back normal.  States that he was planning to be referred to rheumatology but lost his insurance at that time.  He denies any joint pain however he reports muscle soreness and increased fatigue.  He states that the rash is uncomfortable and he rates it as a 3 out of 10 in terms of pain.  He denies a family history of blood clots.

## 2018-12-28 NOTE — PROGRESS NOTES
"Subjective:   Riley Doll is a 28 y.o. male here today for follow-up rash and skin biopsy    Livedo reticularis  Presents for follow up skin biopsy, which came back normal.  Continues to have the rash on both arms, legs, and he states it is also on his trunk.  He reports that it gets worse when he is cold and improves when he warms up.  It only disappears when he works out.  In the past, he was told this could be a circulation issue and he has already seen a dermatologist for it several years ago who also did a skin biopsy that came back normal.  States that he was planning to be referred to rheumatology but lost his insurance at that time.  He denies any joint pain however he reports muscle soreness and increased fatigue.  He states that the rash is uncomfortable and he rates it as a 3 out of 10 in terms of pain.  He denies a family history of blood clots.  He reports improvement in his symptoms when he takes niacin       Current medicines (including changes today)  Current Outpatient Prescriptions   Medication Sig Dispense Refill   • traZODone (DESYREL) 100 MG Tab Take 1 Tab by mouth at bedtime as needed for Sleep. 30 Tab 5     No current facility-administered medications for this visit.      He  has a past medical history of Depression; Insomnia; and Substance abuse ([facility]).    ROS   Denies chest pain, shortness of breath  No ulceration  As above in HPI     Objective:     Blood pressure 118/72, pulse 72, temperature 36.8 °C (98.2 °F), temperature source Temporal, resp. rate 16, height 1.791 m (5' 10.51\"), weight 82.1 kg (181 lb), SpO2 98 %. Body mass index is 25.6 kg/m².   Physical Exam:  Constitutional: Alert, no distress.  Skin: Warm, dry, good turgor, blanchable reticular rash over bilateral arms when he sits in the room with his sleeves of his sweatshirt pulled up for several minutes.  Eye: Equal, round and reactive, conjunctiva clear, lids normal.  Psych: Alert and oriented x3, normal affect and " mood.      Results and Imaging:   FINAL DIAGNOSIS:    A. Skin punch biopsy, left forearm:         Benign skin with no significant inflammation or other features          to suggest a rash.    Assessment and Plan:   The following treatment plan was discussed    1. Livedo reticularis  His rash appears a lot like livedo reticularis which is supported by its kasandra ability and worsening when it is cold out as well as a normal skin biopsy.  However, he has some constitutional symptoms along with it.  So far general workup has been completely unrevealing.  Will check a lupus anticoagulant panel to look for antiphospholipid syndrome or other underlying disorder however we discussed that this might also be idiopathic.  We will attempt to refer him to rheumatology for further workup in terms of the etiology of the rash if referral is accepted.  Let him know to hold off on getting his blood work ordered today until we hear back from rheumatology as they may want more labs before deciding on whether to accept his referral.  If rheumatology declines to see him and lab is normal, could consider vasodilator but I will have to do some more research regarding this.  - LUPUS ANTICOAGULANT PANEL 1  - REFERRAL TO RHEUMATOLOGY        Followup: Return if symptoms worsen or fail to improve.

## 2019-03-08 ENCOUNTER — OFFICE VISIT (OUTPATIENT)
Dept: MEDICAL GROUP | Facility: MEDICAL CENTER | Age: 29
End: 2019-03-08
Attending: INTERNAL MEDICINE
Payer: MEDICAID

## 2019-03-08 ENCOUNTER — HOSPITAL ENCOUNTER (OUTPATIENT)
Dept: LAB | Facility: MEDICAL CENTER | Age: 29
End: 2019-03-08
Attending: INTERNAL MEDICINE
Payer: MEDICAID

## 2019-03-08 VITALS
OXYGEN SATURATION: 97 % | HEIGHT: 71 IN | WEIGHT: 181 LBS | DIASTOLIC BLOOD PRESSURE: 70 MMHG | SYSTOLIC BLOOD PRESSURE: 110 MMHG | RESPIRATION RATE: 16 BRPM | TEMPERATURE: 98.2 F | HEART RATE: 72 BPM | BODY MASS INDEX: 25.34 KG/M2

## 2019-03-08 DIAGNOSIS — R23.1 LIVEDO RETICULARIS: ICD-10-CM

## 2019-03-08 DIAGNOSIS — R21 FACIAL RASH: ICD-10-CM

## 2019-03-08 PROCEDURE — 99212 OFFICE O/P EST SF 10 MIN: CPT | Performed by: INTERNAL MEDICINE

## 2019-03-08 PROCEDURE — 85610 PROTHROMBIN TIME: CPT

## 2019-03-08 PROCEDURE — 85730 THROMBOPLASTIN TIME PARTIAL: CPT

## 2019-03-08 PROCEDURE — 85613 RUSSELL VIPER VENOM DILUTED: CPT

## 2019-03-08 PROCEDURE — 85520 HEPARIN ASSAY: CPT

## 2019-03-08 PROCEDURE — 99214 OFFICE O/P EST MOD 30 MIN: CPT | Performed by: INTERNAL MEDICINE

## 2019-03-08 PROCEDURE — 36415 COLL VENOUS BLD VENIPUNCTURE: CPT

## 2019-03-08 ASSESSMENT — PAIN SCALES - GENERAL: PAINLEVEL: NO PAIN

## 2019-03-09 LAB
APTT PPP: 28.2 SEC (ref 24.7–36)
INR PPP: 1.03 (ref 0.87–1.13)
LA PPP-IMP: NORMAL
PROTHROMBIN TIME: 13.6 SEC (ref 12–14.6)
SCREEN DRVVT: 36.3 SEC (ref 28–48)
UFH PPP CHRO-ACNC: <0.1 U/ML

## 2019-03-10 NOTE — PROGRESS NOTES
"Subjective:   Riley Doll is a 28 y.o. male here today for follow up generalized skin rash, new facial rash    Livedo reticularis  He presents for follow up on the livedo reticularis.  It continues to be extremely bothersome for him.  It causes him pain regularly and he states it also contributes to his depression.  He is interested in seeing rheumatology as referred several months ago in [place].  He did not get the lupus anticoag profile done yet.    Facial rash  He reports an itchy rash on his forehead and chin.  Started about a month ago when he began using a new skin care product.  He has continued to use that product intermittently.  Has been putting 1% hydrocortisone cream on the rash with improvement but not resolution.         Current medicines (including changes today)  Current Outpatient Prescriptions   Medication Sig Dispense Refill   • hydrocortisone 2.5 % Cream topical cream Apply thin layer to rash on face twice daily until resolves 15 g 0   • traZODone (DESYREL) 100 MG Tab Take 1 Tab by mouth at bedtime as needed for Sleep. 30 Tab 5     No current facility-administered medications for this visit.      He  has a past medical history of Depression; Insomnia; and Substance abuse (HCC).    ROS   Denies chest pain, shortness of breath  As above in HPI     Objective:     Blood pressure 110/70, pulse 72, temperature 36.8 °C (98.2 °F), temperature source Temporal, resp. rate 16, height 1.791 m (5' 10.51\"), weight 82.1 kg (181 lb), SpO2 97 %. Body mass index is 25.6 kg/m².   Physical Exam:  Constitutional: Alert, no distress.  Skin: Warm, dry, good turgor, slightly raised pink rash around eyebrows with small patch on chin as well.  Eye: Equal, round and reactive, conjunctiva clear, lids normal.  Psych: Alert and oriented x3, normal affect and mood.    Assessment and Plan:   The following treatment plan was discussed    1. Facial rash  Suspect allergic reaction to new skin care product given " timing of rash.  Discussed d/c product and trial of higher potency hydrocortisone as below  - hydrocortisone 2.5 % Cream topical cream; Apply thin layer to rash on face twice daily until resolves  Dispense: 15 g; Refill: 0    2. Livedo reticularis  He will obtain the lupus anticoag panel and follow up with rheum in [place] given the degree of irritation this causes him.        Followup: Return if symptoms worsen or fail to improve.

## 2019-03-10 NOTE — ASSESSMENT & PLAN NOTE
He reports an itchy rash on his forehead and chin.  Started about a month ago when he began using a new skin care product.  He has continued to use that product intermittently.  Has been putting 1% hydrocortisone cream on the rash with improvement but not resolution.

## 2019-03-10 NOTE — ASSESSMENT & PLAN NOTE
He presents for follow up on the livedo reticularis.  It continues to be extremely bothersome for him.  It causes him pain regularly and he states it also contributes to his depression.  He is interested in seeing rheumatology as referred several months ago in [place].  He did not get the lupus anticoag profile done yet.

## 2020-02-12 ENCOUNTER — OFFICE VISIT (OUTPATIENT)
Dept: MEDICAL GROUP | Facility: MEDICAL CENTER | Age: 30
End: 2020-02-12
Attending: INTERNAL MEDICINE
Payer: MEDICAID

## 2020-02-12 VITALS
DIASTOLIC BLOOD PRESSURE: 78 MMHG | OXYGEN SATURATION: 95 % | WEIGHT: 194 LBS | RESPIRATION RATE: 16 BRPM | TEMPERATURE: 97.9 F | SYSTOLIC BLOOD PRESSURE: 110 MMHG | HEIGHT: 71 IN | BODY MASS INDEX: 27.16 KG/M2 | HEART RATE: 88 BPM

## 2020-02-12 DIAGNOSIS — F51.01 PRIMARY INSOMNIA: ICD-10-CM

## 2020-02-12 DIAGNOSIS — K92.1 HEMATOCHEZIA: ICD-10-CM

## 2020-02-12 DIAGNOSIS — R23.1 LIVEDO RETICULARIS: ICD-10-CM

## 2020-02-12 DIAGNOSIS — L72.9 SCALP CYST: ICD-10-CM

## 2020-02-12 PROBLEM — R53.82 CHRONIC FATIGUE: Status: RESOLVED | Noted: 2018-10-31 | Resolved: 2020-02-12

## 2020-02-12 PROBLEM — M79.10 MUSCLE SORENESS: Status: RESOLVED | Noted: 2018-10-31 | Resolved: 2020-02-12

## 2020-02-12 PROBLEM — F33.1 MODERATE EPISODE OF RECURRENT MAJOR DEPRESSIVE DISORDER (HCC): Status: RESOLVED | Noted: 2018-11-28 | Resolved: 2020-02-12

## 2020-02-12 PROCEDURE — 99212 OFFICE O/P EST SF 10 MIN: CPT | Performed by: INTERNAL MEDICINE

## 2020-02-12 PROCEDURE — 99214 OFFICE O/P EST MOD 30 MIN: CPT | Performed by: INTERNAL MEDICINE

## 2020-02-12 RX ORDER — TRAZODONE HYDROCHLORIDE 100 MG/1
100 TABLET ORAL NIGHTLY PRN
Qty: 30 TAB | Refills: 5 | Status: SHIPPED | OUTPATIENT
Start: 2020-02-12 | End: 2020-10-15 | Stop reason: SDUPTHER

## 2020-02-12 ASSESSMENT — PAIN SCALES - GENERAL: PAINLEVEL: NO PAIN

## 2020-02-12 NOTE — ASSESSMENT & PLAN NOTE
For the past 2 years he has had recurrent hematochezia.  We last discussed this in October 2018 at which time a GI referral was placed however he was never able to follow-up.  He states that over the past year, symptoms have significantly worsened.  With every bowel movement, he has at least some bright red blood in the toilet bowl and on the paper.  He denies constipation or diarrhea.  He will sometimes have pain with bowel movements and sometimes not.  He currently denies chest pain, shortness of breath, increased fatigue but does report some lightheadedness after a bowel movement.  He denies any palpable anal masses.  He has no known history of IBD or hemorrhoids but he has never had a colonoscopy.

## 2020-02-12 NOTE — ASSESSMENT & PLAN NOTE
Last year, we did a skin biopsy which came back normal.  He continues to have the rash on both arms, legs, and trunk.  He reports that it gets worse when he is cold and improves when he warms up.  It only disappears when he works out.  In the past, he was told this could be a circulation issue and he has already seen a dermatologist for it several years ago who also did a skin biopsy that came back normal.  States that he was planning to be referred to rheumatology but lost his insurance at that time.  We had also referred him to rheumatology previously but he was unable to follow-up and has never been evaluated.  He states that the rash is uncomfortable and he rates it as a 3 out of 10 in terms of pain.  He denies a family history of blood clots.  He reports improvement in his symptoms when he takes niacin.  Lupus anticoagulant panel checked 1 year ago was normal.

## 2020-02-12 NOTE — PROGRESS NOTES
Subjective:   Riley Doll is a 29 y.o. male here today for blood in bowel movements, requesting rheumatology referral and medication refills, scalp cyst    Hematochezia  For the past 2 years he has had recurrent hematochezia.  We last discussed this in October 2018 at which time a GI referral was placed however he was never able to follow-up.  He states that over the past year, symptoms have significantly worsened.  With every bowel movement, he has at least some bright red blood in the toilet bowl and on the paper.  He denies constipation or diarrhea.  He will sometimes have pain with bowel movements and sometimes not.  He currently denies chest pain, shortness of breath, increased fatigue but does report some lightheadedness after a bowel movement.  He denies any palpable anal masses.  He has no known history of IBD or hemorrhoids but he has never had a colonoscopy.     Scalp cyst  Reports a cyst on the left parietal scalp that has been there for about 10 years.  States it has been growing and occasionally becomes irritated and inflamed as well as painful.  He also hits it when he brushes his hair or cuts his hair.  He is interested in having it removed.      Insomnia  He continues to take trazodone 100 mg nightly as needed.  Reports this works well for him and he is requesting a refill today.    Livedo reticularis  Last year, we did a skin biopsy which came back normal.  He continues to have the rash on both arms, legs, and trunk.  He reports that it gets worse when he is cold and improves when he warms up.  It only disappears when he works out.  In the past, he was told this could be a circulation issue and he has already seen a dermatologist for it several years ago who also did a skin biopsy that came back normal.  States that he was planning to be referred to rheumatology but lost his insurance at that time.  We had also referred him to rheumatology previously but he was unable to follow-up and  has never been evaluated.  He states that the rash is uncomfortable and he rates it as a 3 out of 10 in terms of pain.  He denies a family history of blood clots.  He reports improvement in his symptoms when he takes niacin.  Lupus anticoagulant panel checked 1 year ago was normal.            Current medicines (including changes today)  Current Outpatient Medications   Medication Sig Dispense Refill   • traZODone (DESYREL) 100 MG Tab Take 1 Tab by mouth at bedtime as needed for Sleep. 30 Tab 5   • hydrocortisone 2.5 % Cream topical cream Apply thin layer to rash on face twice daily until resolves 15 g 2     No current facility-administered medications for this visit.      He  has a past medical history of Depression, Insomnia, and Substance abuse (HCC).    ROS   Denies chest pain, shortness of breath  As above in HPI     Objective:     Vitals:    02/12/20 1115   BP: 110/78   Pulse: 88   Resp: 16   Temp: 36.6 °C (97.9 °F)   SpO2: 95%     Body mass index is 27.43 kg/m².   Physical Exam:  Constitutional: Alert, no distress.  Skin: Warm, dry, good turgor, no rashes in visible areas, 1 cm raised noninflamed sebaceous cyst over left parietal scalp.  Eye: Equal, round and reactive, conjunctiva clear, lids normal.  Psych: Alert and oriented x3, normal affect and mood.      Assessment and Plan:   The following treatment plan was discussed    1. Hematochezia  New referral placed to GI today as he would benefit from colonoscopy to further elucidate etiology of recurrent hematochezia.  Vitals are stable and he does not have any signs to suggest acute anemia.   - REFERRAL TO GASTROENTEROLOGY    2. Primary insomnia  Stable, well-controlled with current meds which were refilled today  - traZODone (DESYREL) 100 MG Tab; Take 1 Tab by mouth at bedtime as needed for Sleep.  Dispense: 30 Tab; Refill: 5    3. Livedo reticularis  Unclear etiology.  It continues to be particularly bothersome to patient and he is requesting rheumatology  referral be laced again today.  We discussed that this could be idiopathic and we have not found a cause for it yet however I have placed the referral at his request  - REFERRAL TO RHEUMATOLOGY    4. Scalp cyst  He would like to have the cyst removed.  Referral placed to general surgery.  - REFERRAL TO GENERAL SURGERY        Followup: Return if symptoms worsen or fail to improve.

## 2020-02-12 NOTE — ASSESSMENT & PLAN NOTE
Reports a cyst on the left parietal scalp that has been there for about 10 years.  States it has been growing and occasionally becomes irritated and inflamed as well as painful.  He also hits it when he brushes his hair or cuts his hair.  He is interested in having it removed.

## 2020-02-12 NOTE — ASSESSMENT & PLAN NOTE
He continues to take trazodone 100 mg nightly as needed.  Reports this works well for him and he is requesting a refill today.

## 2020-04-16 PROBLEM — K60.1 CHRONIC ANAL FISSURE: Status: ACTIVE | Noted: 2020-04-16

## 2020-04-16 PROBLEM — K64.2 GRADE III INTERNAL HEMORRHOIDS: Status: ACTIVE | Noted: 2018-10-31

## 2020-10-15 DIAGNOSIS — F51.01 PRIMARY INSOMNIA: ICD-10-CM

## 2020-10-16 RX ORDER — TRAZODONE HYDROCHLORIDE 100 MG/1
100 TABLET ORAL NIGHTLY PRN
Qty: 30 TAB | Refills: 5 | Status: SHIPPED | OUTPATIENT
Start: 2020-10-16

## 2021-04-27 ENCOUNTER — APPOINTMENT (RX ONLY)
Dept: URBAN - METROPOLITAN AREA CLINIC 20 | Facility: CLINIC | Age: 31
Setting detail: DERMATOLOGY
End: 2021-04-27

## 2021-04-27 DIAGNOSIS — L98.8 OTHER SPECIFIED DISORDERS OF THE SKIN AND SUBCUTANEOUS TISSUE: ICD-10-CM

## 2021-04-27 PROCEDURE — 99204 OFFICE O/P NEW MOD 45 MIN: CPT

## 2021-04-27 PROCEDURE — ? ADDITIONAL NOTES

## 2021-04-27 PROCEDURE — ? PRESCRIPTION

## 2021-04-27 PROCEDURE — ? ORDER TESTS

## 2021-04-27 PROCEDURE — ? COUNSELING

## 2021-04-27 RX ORDER — KETOCONAZOLE 20 MG/ML
SHAMPOO, SUSPENSION TOPICAL
Qty: 1 | Refills: 5 | Status: ERX | COMMUNITY
Start: 2021-04-27

## 2021-04-27 RX ADMIN — KETOCONAZOLE 1: 20 SHAMPOO, SUSPENSION TOPICAL at 00:00

## 2021-04-27 ASSESSMENT — LOCATION SIMPLE DESCRIPTION DERM
LOCATION SIMPLE: RIGHT FOREARM
LOCATION SIMPLE: LEFT FOREARM

## 2021-04-27 ASSESSMENT — LOCATION ZONE DERM: LOCATION ZONE: ARM

## 2021-04-27 ASSESSMENT — LOCATION DETAILED DESCRIPTION DERM
LOCATION DETAILED: RIGHT PROXIMAL DORSAL FOREARM
LOCATION DETAILED: LEFT PROXIMAL DORSAL FOREARM

## 2021-04-27 NOTE — PROCEDURE: ORDER TESTS
Bill For Surgical Tray: no
Expected Date Of Service: 04/27/2021
Performing Laboratory: [id number]
Billing Type: Third-Party Bill

## 2021-04-27 NOTE — PROCEDURE: ADDITIONAL NOTES
Detail Level: Simple
Additional Notes: Pt reports 3-4 year history of white spots on his arms\\nA dermatologist in [place] thought it was vascular related. He saw a rheumatologist in [place], had labwork which was normal. PCP in [place] did a biopsy which showed normal skin 12/2018, and had additional labwork during that time.\\n\\nDiscussed that I do favor that these are [place] spots \\nDiscussed that these are commonly idiopathic \\nLess likely related to underlying condition, will order several lab tests\\n\\nCan trial ketoconazole shampoo, unlikely tinea versicolor component, KOH negative.
Render Risk Assessment In Note?: no

## 2021-07-30 NOTE — ED PROVIDER NOTES
ED Provider Note    Scribed for Darrell Cassidy M.D. by Carlos Johnston. 10/18/2018, 12:38 PM.    Primary care provider: No primary care provider on file.  Means of arrival: Personal vehicle   History obtained from: Patient   History limited by: None     CHIEF COMPLAINT  Chief Complaint   Patient presents with   • Fatigue     for months, progressively worse   • Generalized Body Aches   • Bloody Stools     x 2 weeks       HPI  Riley Doll is a 28 y.o. male who presents to the Emergency Department with general fatigue ongoing for months. The patient states he has been having general fatigue ongoing for a few months and has been progressively worsening to the point when he feels like he is going to pass out. The patient does report he is able to exercise normally but sometimes gets weakness episodes. He states he is also having some red blood in his stools for the past two weeks and general body aches. He denies any abdominal pain but endorses rectal pain. He also notes some white spots to his forearms and that pimples have been taking a while to heal. Patients states a history of MRSA. Patient denies any family history of colon issues. He denies any nausea, vomiting, weakness, dizziness, fever, chills, abdominal pain, diarrhea.     REVIEW OF SYSTEMS  Pertinent positives include general fatigue, rectal pain, bloody stools, body aches. Pertinent negatives include no nausea, vomiting, weakness, dizziness, fever, chills, abdominal pain, diarrhea. As above, all other systems reviewed and are negative.   See HPI for further details.     PAST MEDICAL HISTORY   Patient reports a history of MRSA    SURGICAL HISTORY  None pertinent    SOCIAL HISTORY  Patient states he recently moved her from [place].     FAMILY HISTORY  None pertinent    CURRENT MEDICATIONS  Home Medications     Reviewed by Johanna Ford R.N. (Registered Nurse) on 10/18/18 at 0919  Med List Status: <None>   Medication Last Dose Status        Patient  Patient is a 89yo Female with ESRD on HD, Afib on Eliquis, HTN, Intradialytic hypotension req Midodrine prior to HD, failed Rt AVF due to steal syndrome s/p ligation, h/o COVID s/p PPM p/w SOB x 1 day. Pt a/w Entero/ Rhino virus, acute respiratory distress requiring BIPAP with concerns for fluid overload. Nephrology consulted for ESRD status.     1) ESRD: Last HD 7/29, with intradialytic hypotension (ehsan 78/45) with net 231 ml. Pt did not receive midodrine prior to HD on 7/29; unclear why. Plan for next HD 7/31 (please give midodrine 30 mins Prior to HD). Monitor electrolytes.  2) HTN 2/2 CKD- BP acceptable. Pt on Metoprolol 25mg PO bid for rate control. Continue with Midodrine 10mg PO prior to HD to aid in fluid removal. Monitor BP  3) Anemia of renal disease: Hb low. Will avoid IV iron due to elevated Ferritin. Will increase Epogen to 10k units IV tiw with HD. Monitor Hb.  4) Hyperphosphatemia: Phosphorus acceptable. No need for phos binder. Phos liberalized in diet. Monitor serum calcium and phosphorus.    Spoke with pt's daughter, Cece 7/28. Discussed that pt is not tolerating HD and unable to tolerate fluid removal despite Midodrine 10mg PO prior to HD. As per daughter, she is fine in outpt dialysis and needs to be d/c soon to resume outpt dialysis at [facility]. Daughter unrealistic about long term goals; pt remains full code.     [facility] NEPHROLOG[facility]  Brad Chen M.D.  Oneal Tim D.O.  Roselia Huitron M.D.  Milli Platt, PARAG, ANP-C  [phone]    [address] [place] [address]   "Eriberto Taking any Medications                     ALLERGIES  Allergies   Allergen Reactions   • Sulfa Drugs        PHYSICAL EXAM  VITAL SIGNS: /61   Pulse 64   Temp 36.7 °C (98.1 °F)   Resp 18   Ht 1.778 m (5' 10\")   Wt 88.7 kg (195 lb 8.8 oz)   SpO2 96%   BMI 28.06 kg/m²   Vitals reviewed.  Constitutional: Alert in no apparent distress.  HENT: No signs of trauma, Bilateral external ears normal, Nose normal.   Eyes: Pupils are equal and reactive, Conjunctiva normal, Non-icteric.   Neck: Normal range of motion, No tenderness, Supple, No stridor.   Lymphatic: No lymphadenopathy noted.   Cardiovascular: Regular rate and rhythm, no murmurs.   Thorax & Lungs: Normal breath sounds, No respiratory distress, No wheezing, No chest tenderness.   Abdomen: Bowel sounds normal, Soft, No tenderness, No peritoneal signs, No masses, No pulsatile masses.   Skin: Warm, Dry, No erythema, No rash.   Back: No bony tenderness, No CVA tenderness.   Extremities: Intact distal pulses, No edema, No tenderness, No cyanosis  Musculoskeletal: Good range of motion in all major joints. No tenderness to palpation or major deformities noted.   Neurologic: Alert , Normal motor function, Normal sensory function, No focal deficits noted.   Psychiatric: Affect normal, Judgment normal, Mood normal.     DIAGNOSTIC STUDIES / PROCEDURES    LABS  Labs Reviewed   CBC WITH DIFFERENTIAL - Abnormal; Notable for the following:        Result Value    MCHC 33.6 (*)     All other components within normal limits    Narrative:     Indicate which anticoagulants the patient is on:->NONE   COMP METABOLIC PANEL - Abnormal; Notable for the following:     Sodium 134 (*)     Bun 25 (*)     All other components within normal limits    Narrative:     Indicate which anticoagulants the patient is on:->NONE   PROTHROMBIN TIME    Narrative:     Indicate which anticoagulants the patient is on:->NONE   APTT    Narrative:     Indicate which anticoagulants the patient is " on:->NONE   TSH    Narrative:     Indicate which anticoagulants the patient is on:->NONE   ESTIMATED GFR    Narrative:     Indicate which anticoagulants the patient is on:->NONE      All labs reviewed by me.     COURSE & MEDICAL DECISION MAKING  Nursing notes, VS, PMSFHx reviewed in chart.    Differential diagnoses include but not limited to: Anemia, hypothyroidism, electrolyte abnormality.      12:38 PM Patient seen and examined at bedside. Ordered for CBC, CMP, PT/INR, PTT, TSH to evaluate. The patient was made aware of his plan of care and was agreeable at this time.     2:37 PM - I spoke to the patient at bedside and informed him of his results. The patient is not anemic and has had stable vitals throughout his visit here today. I told the patient that he needs to follow up with GI for a colonoscopy and to follow up for his already scheduled appointment with primary care. I spoke to the patient about his skin changes and how this could be related to an auto-immune disorder and after he sees his primary care and GI he can follow up with a Rheumatologist through his primary care.  The patient's symptoms are very vague and it is not clear to me at this time what his diagnosis is.  The patient was receptive of his plan of care and is agreeable with discharge home at this time.     The patient will not drink alcohol nor drive with prescribed medications. The patient will return for worsening symptoms and is stable at the time of discharge. The patient verbalizes understanding and will comply.    DISPOSITION:  Patient will be discharged home in stable condition.    FOLLOW UP:  [facility], Emergency Dept  [address] [facility] [id number] [phone]    If symptoms worsen    [facility]  [address] [place] [id number] [phone]    call for follow up          see primary doctor as scheduled      FINAL IMPRESSION  1. Weakness    2. BRBPR (bright red blood  per rectum)          ICarlos (Scribe), am scribing for, and in the presence of, Darrell Cassidy M.D..    Electronically signed by: Carlos Johnston (Arlen), 10/18/2018    IDarrell M.D. personally performed the services described in this documentation, as scribed by Carlos Johnston in my presence, and it is both accurate and complete. E.     The note accurately reflects work and decisions made by me.  Darrell Cassidy  10/18/2018  2:55 PM

## 2024-08-13 ENCOUNTER — OFFICE VISIT (OUTPATIENT)
Dept: URGENT CARE | Facility: PHYSICIAN GROUP | Age: 34
End: 2024-08-13
Payer: COMMERCIAL

## 2024-08-13 VITALS
HEIGHT: 70 IN | OXYGEN SATURATION: 97 % | HEART RATE: 72 BPM | SYSTOLIC BLOOD PRESSURE: 100 MMHG | RESPIRATION RATE: 16 BRPM | WEIGHT: 190 LBS | DIASTOLIC BLOOD PRESSURE: 60 MMHG | TEMPERATURE: 98.9 F | BODY MASS INDEX: 27.2 KG/M2

## 2024-08-13 DIAGNOSIS — F51.01 PRIMARY INSOMNIA: ICD-10-CM

## 2024-08-13 DIAGNOSIS — J01.90 ACUTE BACTERIAL SINUSITIS: ICD-10-CM

## 2024-08-13 DIAGNOSIS — B96.89 ACUTE BACTERIAL SINUSITIS: ICD-10-CM

## 2024-08-13 DIAGNOSIS — R21 RASH: ICD-10-CM

## 2024-08-13 PROCEDURE — 3078F DIAST BP <80 MM HG: CPT | Performed by: NURSE PRACTITIONER

## 2024-08-13 PROCEDURE — 99203 OFFICE O/P NEW LOW 30 MIN: CPT | Performed by: NURSE PRACTITIONER

## 2024-08-13 PROCEDURE — 3074F SYST BP LT 130 MM HG: CPT | Performed by: NURSE PRACTITIONER

## 2024-08-13 RX ORDER — HYDROXYZINE HYDROCHLORIDE 25 MG/1
25 TABLET, FILM COATED ORAL
Qty: 60 TABLET | Refills: 0 | Status: SHIPPED | OUTPATIENT
Start: 2024-08-13

## 2024-08-13 RX ORDER — TRIAMCINOLONE ACETONIDE 1 MG/G
1 CREAM TOPICAL 2 TIMES DAILY
Qty: 45 G | Refills: 0 | Status: SHIPPED | OUTPATIENT
Start: 2024-08-13

## 2024-08-13 ASSESSMENT — ENCOUNTER SYMPTOMS
SORE THROAT: 0
INSOMNIA: 1
DIARRHEA: 0
COUGH: 0
FEVER: 0
MYALGIAS: 0
HEADACHES: 0
NAUSEA: 0
DIZZINESS: 0
CHILLS: 0

## 2024-08-14 NOTE — PROGRESS NOTES
Subjective     Riley Doll is a 34 y.o. male who presents with Insomnia (Pt states he doesn't have a PCP but has been having problems with insomnia for most of his life. Pt also concern of a rash on his skin.)            HPI  New problem.  Patient is a 34-year-old male who presents with several problems today.  His first issues with insomnia which she states has been a lifetime issue for him.  He states he is difficult for him to fall asleep and stay asleep per his report.  He has been treated in the past with trazodone 100 mg however he states he does not like this medication and is inquiring about Ambien or Lunesta.  Additionally he is concerned about a rash that he has had to his arms that has been treated in the past by his primary care through the [facility].  He was eventually referred to dermatology and rheumatology however I do not know the results of these visits.  He states the rash is itchy and burns.  His last issue is that he thinks he may have a sinus infection as he started out with a cold approximately a month ago and since that time has can continued nasal congestion, and runny nose.  He denies fever, chills, myalgia, nausea, or diarrhea.  He denies any past medical history of skin issues as a child.  He has not seen a primary care since 2020.    Sulfa drugs  Current Outpatient Medications on File Prior to Visit   Medication Sig Dispense Refill    traZODone (DESYREL) 100 MG Tab Take 1 Tab by mouth at bedtime as needed for Sleep. (Patient not taking: Reported on 8/13/2024) 30 Tab 5    hydrocortisone 2.5 % Cream topical cream Apply thin layer to rash on face twice daily until resolves (Patient not taking: Reported on 8/13/2024) 15 g 2     No current facility-administered medications on file prior to visit.     Social History     Socioeconomic History    Marital status: Single     Spouse name: Not on file    Number of children: Not on file    Years of education: Not on file     "Highest education level: Not on file   Occupational History    Not on file   Tobacco Use    Smoking status: Former     Current packs/day: 0.00     Types: Cigarettes     Start date: 10/31/2011     Quit date: 10/31/2014     Years since quittin.7    Smokeless tobacco: Never    Tobacco comments:     1 pack every 2 weeks   Substance and Sexual Activity    Alcohol use: No     Comment: 3-4 days per week fifth per day x 3 years, quit     Drug use: No    Sexual activity: Not Currently   Other Topics Concern    Not on file   Social History Narrative    Not on file     Social Determinants of Health     Financial Resource Strain: Not on file   Food Insecurity: Not on file   Transportation Needs: Not on file   Physical Activity: Not on file   Stress: Not on file   Social Connections: Not on file   Intimate Partner Violence: Not on file   Housing Stability: Not on file     Breast Cancer-related family history is not on file.      Review of Systems   Constitutional:  Positive for malaise/fatigue. Negative for chills and fever.   HENT:  Positive for congestion. Negative for sore throat.    Respiratory:  Negative for cough.    Gastrointestinal:  Negative for diarrhea and nausea.   Musculoskeletal:  Negative for myalgias.   Skin:  Positive for itching and rash.   Neurological:  Negative for dizziness and headaches.   Psychiatric/Behavioral:  The patient has insomnia.               Objective     /60 (BP Location: Left arm, Patient Position: Sitting, BP Cuff Size: Adult long)   Pulse 72   Temp 37.2 °C (98.9 °F) (Temporal)   Resp 16   Ht 1.778 m (5' 10\")   Wt 86.2 kg (190 lb)   SpO2 97%   BMI 27.26 kg/m²      Physical Exam  Vitals and nursing note reviewed.   Constitutional:       General: He is not in acute distress.     Appearance: He is well-developed.   HENT:      Head: Normocephalic.      Right Ear: External ear normal.      Left Ear: External ear normal.      Nose: Mucosal edema and rhinorrhea present.      " Mouth/Throat:      Pharynx: No posterior oropharyngeal erythema.   Eyes:      General:         Right eye: No discharge.         Left eye: No discharge.      Conjunctiva/sclera: Conjunctivae normal.   Cardiovascular:      Rate and Rhythm: Normal rate and regular rhythm.      Heart sounds: Normal heart sounds.   Pulmonary:      Effort: Pulmonary effort is normal.      Breath sounds: Normal breath sounds.   Musculoskeletal:         General: Normal range of motion.      Cervical back: Normal range of motion and neck supple.   Lymphadenopathy:      Cervical: No cervical adenopathy.   Skin:     General: Skin is warm and dry.      Findings: Rash present.      Comments: Papular rash to bilateral upper arms.   Neurological:      Mental Status: He is alert and oriented to person, place, and time.   Psychiatric:         Behavior: Behavior normal.         Thought Content: Thought content normal.                             Assessment & Plan        1. Rash  Referral to Dermatology    triamcinolone acetonide (KENALOG) 0.1 % Cream      2. Primary insomnia  hydrOXYzine HCl (ATARAX) 25 MG Tab    Referral to establish with PCP      3. Acute bacterial sinusitis  amoxicillin-clavulanate (AUGMENTIN) 875-125 MG Tab        Derm referral and triamcinolone for his rash.  Trial of hydroxyzine- deferred on controlled substance for sleep until he follows up with PCP.  Referral to PCP.  Augmentin for his sinus infection.   Differential diagnosis, natural history, supportive care, and indications for immediate follow-up were discussed.

## 2025-01-22 ENCOUNTER — TELEPHONE (OUTPATIENT)
Dept: HEALTH INFORMATION MANAGEMENT | Facility: OTHER | Age: 35
End: 2025-01-22
Payer: COMMERCIAL

## 2025-01-24 ENCOUNTER — OFFICE VISIT (OUTPATIENT)
Dept: URGENT CARE | Facility: CLINIC | Age: 35
End: 2025-01-24
Payer: COMMERCIAL

## 2025-01-24 VITALS
BODY MASS INDEX: 27.06 KG/M2 | DIASTOLIC BLOOD PRESSURE: 72 MMHG | TEMPERATURE: 97.5 F | OXYGEN SATURATION: 96 % | HEART RATE: 87 BPM | HEIGHT: 70 IN | SYSTOLIC BLOOD PRESSURE: 116 MMHG | WEIGHT: 189 LBS | RESPIRATION RATE: 18 BRPM

## 2025-01-24 DIAGNOSIS — Z20.828 EXPOSURE TO RESPIRATORY SYNCYTIAL VIRUS (RSV): ICD-10-CM

## 2025-01-24 DIAGNOSIS — B33.8 RSV (RESPIRATORY SYNCYTIAL VIRUS INFECTION): ICD-10-CM

## 2025-01-24 DIAGNOSIS — R05.1 ACUTE COUGH: ICD-10-CM

## 2025-01-24 LAB
FLUAV RNA SPEC QL NAA+PROBE: NEGATIVE
FLUBV RNA SPEC QL NAA+PROBE: NEGATIVE
RSV RNA SPEC QL NAA+PROBE: POSITIVE
SARS-COV-2 RNA RESP QL NAA+PROBE: NEGATIVE

## 2025-01-24 PROCEDURE — 99213 OFFICE O/P EST LOW 20 MIN: CPT

## 2025-01-24 PROCEDURE — 3078F DIAST BP <80 MM HG: CPT

## 2025-01-24 PROCEDURE — 3074F SYST BP LT 130 MM HG: CPT

## 2025-01-24 PROCEDURE — 87637 SARSCOV2&INF A&B&RSV AMP PRB: CPT | Mod: QW

## 2025-01-24 RX ORDER — ZOLPIDEM TARTRATE 10 MG/1
TABLET ORAL
COMMUNITY

## 2025-01-24 RX ORDER — FLUTICASONE PROPIONATE 50 MCG
1 SPRAY, SUSPENSION (ML) NASAL DAILY
Qty: 16 G | Refills: 0 | Status: SHIPPED | OUTPATIENT
Start: 2025-01-24

## 2025-01-24 RX ORDER — METHYLPREDNISOLONE 4 MG/1
TABLET ORAL
Qty: 21 TABLET | Refills: 0 | Status: SHIPPED | OUTPATIENT
Start: 2025-01-24

## 2025-01-24 RX ORDER — BENZONATATE 100 MG/1
100 CAPSULE ORAL 3 TIMES DAILY PRN
Qty: 30 CAPSULE | Refills: 0 | Status: SHIPPED | OUTPATIENT
Start: 2025-01-24 | End: 2025-02-03

## 2025-01-24 ASSESSMENT — ENCOUNTER SYMPTOMS
SINUS PAIN: 0
MYALGIAS: 0
EYE DISCHARGE: 0
SPUTUM PRODUCTION: 0
WHEEZING: 0
SORE THROAT: 1
SHORTNESS OF BREATH: 0
STRIDOR: 0
VOMITING: 0
BACK PAIN: 0
NAUSEA: 0
COUGH: 1
CHILLS: 1
FEVER: 1
HEMOPTYSIS: 0

## 2025-01-24 NOTE — LETTER
January 24, 2025    To Whom It May Concern:         This is confirmation that Riley Doll attended his scheduled appointment with YOLANDA Blanton on 1/24/25.         If you have any questions please do not hesitate to call me at the phone number listed below.    Sincerely,          JIA Blanton.  [phone]

## 2025-01-25 NOTE — PROGRESS NOTES
"Subjective:   Riley Doll is a 34 y.o. male who presents for Letter for School/Work (Need work note excusing him)      Patient presents with complaints of exposure to RSV, acute cough, nasal congestion, chills and fever.  Patient states symptoms started 3 days ago.  States that both his 6-year-old son is 2-year-old daughter tested positive for RSV.  States his 2-year-old is in [facility] due to complications of RSV.  Patient states his symptoms have been \"mild\" he is requesting to have testing done as he needs it for work.  States he has not use anything to treat his symptoms.  He denies any shortness of breath, chest pain, stridor or wheezing        Review of Systems   Constitutional:  Positive for chills and fever.   HENT:  Positive for congestion and sore throat. Negative for ear discharge, ear pain and sinus pain.    Eyes:  Negative for discharge.   Respiratory:  Positive for cough. Negative for hemoptysis, sputum production, shortness of breath, wheezing and stridor.    Cardiovascular:  Negative for chest pain.   Gastrointestinal:  Negative for nausea and vomiting.   Genitourinary: Negative.    Musculoskeletal:  Negative for back pain and myalgias.   Skin:  Negative for rash.     Refer to HPI for additional details.    During this visit, appropriate PPE was worn, and hand hygiene was performed.    PMH:  has a past medical history of Depression, Insomnia, and Substance abuse (HCC).    MEDS:   Current Outpatient Medications:     zolpidem (AMBIEN) 10 MG Tab, TAKE ONE TABLET BY MOUTH EVERY DAY AT BEDTIME Ok TO TAKE 10 MG HYDROXYZINE IF AMBIEN IS NOT AFFECTIVE, Disp: , Rfl:     methylPREDNISolone (MEDROL DOSEPAK) 4 MG Tablet Therapy Pack, Follow schedule on package instructions., Disp: 21 Tablet, Rfl: 0    fluticasone (FLONASE) 50 MCG/ACT nasal spray, Administer 1 Spray into affected nostril(S) every day., Disp: 16 g, Rfl: 0    benzonatate (TESSALON) 100 MG Cap, Take 1 Capsule by mouth 3 " "times a day as needed for Cough for up to 10 days., Disp: 30 Capsule, Rfl: 0    hydrOXYzine HCl (ATARAX) 25 MG Tab, Take 1 Tablet by mouth at bedtime., Disp: 60 Tablet, Rfl: 0    triamcinolone acetonide (KENALOG) 0.1 % Cream, Apply 1 Application topically 2 times a day. (Patient not taking: Reported on 1/24/2025), Disp: 45 g, Rfl: 0    traZODone (DESYREL) 100 MG Tab, Take 1 Tab by mouth at bedtime as needed for Sleep. (Patient not taking: Reported on 1/24/2025), Disp: 30 Tab, Rfl: 5    hydrocortisone 2.5 % Cream topical cream, Apply thin layer to rash on face twice daily until resolves (Patient not taking: Reported on 1/24/2025), Disp: 15 g, Rfl: 2    ALLERGIES:   Allergies   Allergen Reactions    Sulfa Drugs      SURGHX:   Past Surgical History:   Procedure Laterality Date    VOCAL CORD POLYP EXCISION       SOCHX:  reports that he quit smoking about 10 years ago. His smoking use included cigarettes. He started smoking about 13 years ago. He has never used smokeless tobacco. He reports that he does not drink alcohol and does not use drugs.    FH: Per HPI as applicable/pertinent.    Medications, Allergies, and current problem list reviewed today in Epic.     Objective:     /72   Pulse 87   Temp 36.4 °C (97.5 °F)   Resp 18   Ht 1.778 m (5' 10\")   Wt 85.7 kg (189 lb)   SpO2 96%     Physical Exam  Constitutional:       General: He is not in acute distress.     Appearance: Normal appearance.   HENT:      Head: Normocephalic.      Right Ear: Tympanic membrane, ear canal and external ear normal.      Left Ear: Tympanic membrane, ear canal and external ear normal.      Nose: Congestion and rhinorrhea present.      Mouth/Throat:      Mouth: Mucous membranes are dry.      Pharynx: No oropharyngeal exudate or posterior oropharyngeal erythema.   Eyes:      Conjunctiva/sclera: Conjunctivae normal.   Cardiovascular:      Rate and Rhythm: Normal rate.   Pulmonary:      Effort: Pulmonary effort is normal. No respiratory " distress.      Breath sounds: Normal breath sounds. No stridor. No wheezing, rhonchi or rales.   Chest:      Chest wall: No tenderness.   Musculoskeletal:      Cervical back: Normal range of motion. No tenderness.   Lymphadenopathy:      Cervical: No cervical adenopathy.   Neurological:      Mental Status: He is alert.         Assessment/Plan:     Diagnosis and associated orders:     1. RSV (respiratory syncytial virus infection)  - POCT CoV-2, Flu A/B, RSV by PCR  - methylPREDNISolone (MEDROL DOSEPAK) 4 MG Tablet Therapy Pack; Follow schedule on package instructions.  Dispense: 21 Tablet; Refill: 0  - fluticasone (FLONASE) 50 MCG/ACT nasal spray; Administer 1 Spray into affected nostril(S) every day.  Dispense: 16 g; Refill: 0    2. Acute cough  - POCT CoV-2, Flu A/B, RSV by PCR  - methylPREDNISolone (MEDROL DOSEPAK) 4 MG Tablet Therapy Pack; Follow schedule on package instructions.  Dispense: 21 Tablet; Refill: 0  - benzonatate (TESSALON) 100 MG Cap; Take 1 Capsule by mouth 3 times a day as needed for Cough for up to 10 days.  Dispense: 30 Capsule; Refill: 0    3. Exposure to respiratory syncytial virus (RSV)  - POCT CoV-2, Flu A/B, RSV by PCR    Other orders  - zolpidem (AMBIEN) 10 MG Tab; TAKE ONE TABLET BY MOUTH EVERY DAY AT BEDTIME Ok TO TAKE 10 MG HYDROXYZINE IF AMBIEN IS NOT AFFECTIVE     Comments/MDM:     Patient history and physical exam are consistent with acute cough with concomitant pharyngitis, fever, and nasal congestion.  Patient presents well appearing in clinic no red flag symptoms seen on physical exam or endorsed by patient.  Respiratory exam unremarkable for any adventitious breath sounds.  Rest of physical exam as stated above  Discussed findings with patient, did suggest doing in clinic viral swab to rule out any common pathogens.  Patient is on board  In clinic viral swab positive for RSV ( resulted 1/26/25)  Outpatient management will consist of copious fluids and electrolytes, stacked  Tylenol and ibuprofen, Medrol for inflammation cough, Flonase for nasal congestion, benzonatate as needed for persistent cough, monitor symptoms  Follow up in 3-5 days if no improvement in symptoms           Differential diagnosis, natural history, supportive care, and indications for immediate follow-up discussed.    Advised the patient to follow-up with the primary care physician for recheck, reevaluation, and consideration of further management.    Please note that this dictation was created using voice recognition software. I have made a reasonable attempt to correct obvious errors, but I expect that there are errors of grammar and possibly content that I did not discover before finalizing the note.    This note was electronically signed by YOLANDA Blanton

## 2025-02-24 PROCEDURE — 99284 EMERGENCY DEPT VISIT MOD MDM: CPT

## 2025-02-25 ENCOUNTER — HOSPITAL ENCOUNTER (EMERGENCY)
Facility: MEDICAL CENTER | Age: 35
End: 2025-02-25
Attending: STUDENT IN AN ORGANIZED HEALTH CARE EDUCATION/TRAINING PROGRAM
Payer: COMMERCIAL

## 2025-02-25 VITALS
WEIGHT: 190 LBS | HEART RATE: 86 BPM | SYSTOLIC BLOOD PRESSURE: 126 MMHG | RESPIRATION RATE: 20 BRPM | BODY MASS INDEX: 27.2 KG/M2 | HEIGHT: 70 IN | DIASTOLIC BLOOD PRESSURE: 78 MMHG | TEMPERATURE: 96.5 F | OXYGEN SATURATION: 95 %

## 2025-02-25 DIAGNOSIS — K62.5 RECTAL BLEEDING: ICD-10-CM

## 2025-02-25 DIAGNOSIS — K59.00 CONSTIPATION, UNSPECIFIED CONSTIPATION TYPE: ICD-10-CM

## 2025-02-25 LAB
ERYTHROCYTE [DISTWIDTH] IN BLOOD BY AUTOMATED COUNT: 41.6 FL (ref 35.9–50)
HCT VFR BLD AUTO: 45.5 % (ref 42–52)
HGB BLD-MCNC: 15.5 G/DL (ref 14–18)
HOLDING TUBE BB 8507: NORMAL
MCH RBC QN AUTO: 28.1 PG (ref 27–33)
MCHC RBC AUTO-ENTMCNC: 34.1 G/DL (ref 32.3–36.5)
MCV RBC AUTO: 82.4 FL (ref 81.4–97.8)
PLATELET # BLD AUTO: 282 K/UL (ref 164–446)
PMV BLD AUTO: 9.9 FL (ref 9–12.9)
RBC # BLD AUTO: 5.52 M/UL (ref 4.7–6.1)
WBC # BLD AUTO: 10.1 K/UL (ref 4.8–10.8)

## 2025-02-25 PROCEDURE — 36415 COLL VENOUS BLD VENIPUNCTURE: CPT

## 2025-02-25 PROCEDURE — 85027 COMPLETE CBC AUTOMATED: CPT

## 2025-02-25 PROCEDURE — 700101 HCHG RX REV CODE 250: Mod: UD | Performed by: STUDENT IN AN ORGANIZED HEALTH CARE EDUCATION/TRAINING PROGRAM

## 2025-02-25 RX ORDER — TRANEXAMIC ACID 100 MG/ML
3 INJECTION, SOLUTION INTRAVENOUS ONCE
Status: COMPLETED | OUTPATIENT
Start: 2025-02-25 | End: 2025-02-25

## 2025-02-25 RX ORDER — DOCUSATE SODIUM 100 MG/1
100 CAPSULE, LIQUID FILLED ORAL 2 TIMES DAILY
Qty: 30 CAPSULE | Refills: 0 | Status: SHIPPED | OUTPATIENT
Start: 2025-02-25 | End: 2025-03-12

## 2025-02-25 RX ADMIN — TRANEXAMIC ACID 300 MG: 100 INJECTION, SOLUTION INTRAVENOUS at 00:33

## 2025-02-25 NOTE — ED NOTES
Med Rec completed per Patient     Allergies reviewed: yes     Antibiotics in the past 30 days: no    Anticoagulant in past 14 days: no    Pharmacy patient utilizes: [facility]   [phone]

## 2025-02-25 NOTE — ED TRIAGE NOTES
"Chief Complaint   Patient presents with    Rectal Bleeding     Pt surgery 2/20 for hemorrhoids and is currently having an excess amount of bleeding. Pt has picture of excess bleeding with clots. Passing mainly clots with any feeling of BM 10-20 minutes of bleeding.      Post-Op Complications     Hemorrhoids surgery. Not able to produce BM but is passing clots and excess amount of blood.      Ambulatory to triage for above complaints and placed in wheelchair. Pt Aox4 GCS 15 does look slightly pale and diaphoretic     Charge notified and pt roomed. Report to RN     BP (!) 149/81   Pulse 88   Temp 35.8 °C (96.5 °F) (Temporal)   Resp 18   Ht 1.778 m (5' 10\")   Wt 86.2 kg (190 lb)   SpO2 96%   BMI 27.26 kg/m²     "

## 2025-02-25 NOTE — ED NOTES
"Riley Doll has been discharged from the Emergency Room.    IV discontinued and gauze bandage placed, pt in possession of belongings.    Discharge instructions, which include signs and symptoms to monitor patient for, as well as detailed information regarding rectal bleeding provided.  Patient verbalizes understanding of follow up care and medication management. All questions and concerns addressed at this time.     Patient provided with education on when to return to the ER and verbally understands with no concerns. Patient advised on setting up MyChart and information provided about patient survey.  Patient leaves ER in no apparent distress. This RN provided education regarding returning to the ER for any new concerns or changes in patient's condition.      /78   Pulse 86   Temp 35.8 °C (96.5 °F) (Temporal)   Resp 20   Ht 1.778 m (5' 10\")   Wt 86.2 kg (190 lb)   SpO2 95%   BMI 27.26 kg/m²    "

## 2025-02-25 NOTE — ED PROVIDER NOTES
ED Provider Note    CHIEF COMPLAINT  Chief Complaint   Patient presents with    Rectal Bleeding     Pt surgery 2/20 for hemorrhoids and is currently having an excess amount of bleeding. Pt has picture of excess bleeding with clots. Passing mainly clots with any feeling of BM 10-20 minutes of bleeding.      Post-Op Complications     Hemorrhoids surgery. Not able to produce BM but is passing clots and excess amount of blood.      LIMITATION TO HISTORY   Select: None    HPI    Riley Doll is a 34 y.o. male who presents to the Emergency Department for evaluation of rectal bleeding following a surgery following a hemorrhoid surgery on 2/20/25 performed by Dr. Meza. The patient provides a video which depicts shows passed clots in the shower and toilet following an urge to have a bowel movement tonight. In the ED tonight he states he has active rectal bleeding. He reports straining before this bowel movement. He has not had the bowel movement since Thursday and reports 8/10 pain since the surgery. Has taken narcotic pain medication with minimal alleviation. He states he took 17g dose of Miralax today, but has been eating fruits and significant amount of fiber before today.     OUTSIDE HISTORIAN(S):  Select: None    EXTERNAL RECORDS REVIEWED  Select: Reviewed outpatient record from [facility] on 1/13/25 which shows the patient had prolapsing internal hemorrhoids or sentinel polyps associated with an anal fissure. This note mentions he is a good candidate for hemorrhoidectomy or sphincterectomy.     PAST MEDICAL HISTORY  Past Medical History:   Diagnosis Date    Depression     Insomnia     Substance abuse (HCC)     ETOH     SURGICAL HISTORY  Past Surgical History:   Procedure Laterality Date    VOCAL CORD POLYP EXCISION       FAMILY HISTORY  Family History   Problem Relation Age of Onset    Alcohol/Drug Mother     Kidney Disease Father     Cancer Paternal Grandmother         colon      SOCIAL  "HISTORY  Social History     Socioeconomic History    Marital status: Single     Spouse name: Not on file    Number of children: Not on file    Years of education: Not on file    Highest education level: Not on file   Occupational History    Not on file   Tobacco Use    Smoking status: Former     Current packs/day: 0.00     Types: Cigarettes     Start date: 10/31/2011     Quit date: 10/31/2014     Years since quitting: 10.3    Smokeless tobacco: Never    Tobacco comments:     1 pack every 2 weeks   Vaping Use    Vaping status: Never Used   Substance and Sexual Activity    Alcohol use: No     Comment: 3-4 days per week fifth per day x 3 years, quit 2014    Drug use: No    Sexual activity: Not Currently   Other Topics Concern    Not on file   Social History Narrative    Not on file     Social Drivers of Health     Financial Resource Strain: Not on file   Food Insecurity: Not on file   Transportation Needs: Not on file   Physical Activity: Not on file   Stress: Not on file   Social Connections: Not on file   Intimate Partner Violence: Not on file   Housing Stability: Not on file     CURRENT MEDICATIONS  No current facility-administered medications on file prior to encounter.     Current Outpatient Medications on File Prior to Encounter   Medication Sig Dispense Refill    fluticasone (FLONASE) 50 MCG/ACT nasal spray Administer 1 Spray into affected nostril(S) every day. 16 g 0    zolpidem (AMBIEN) 10 MG Tab TAKE ONE TABLET BY MOUTH EVERY DAY AT BEDTIME Ok TO TAKE 10 MG HYDROXYZINE IF AMBIEN IS NOT AFFECTIVE      methylPREDNISolone (MEDROL DOSEPAK) 4 MG Tablet Therapy Pack Follow schedule on package instructions. (Patient taking differently: Follow schedule on package instructions.  Never picked up from Pharmacy) 21 Tablet 0     ALLERGIES  Allergies   Allergen Reactions    Sulfa Drugs Swelling     PHYSICAL EXAM  VITAL SIGNS:BP (!) 149/81   Pulse 88   Temp 35.8 °C (96.5 °F) (Temporal)   Resp 18   Ht 1.778 m (5' 10\")   " Wt 86.2 kg (190 lb)   SpO2 96%   BMI 27.26 kg/m²       GENERAL: Awake and alert  HEAD: Normocephalic and atraumatic  NECK: Normal range of motion, without meningismus  EYES: Pupils Equal, Round, Reactive to Light, extraocular movements intact, conjunctiva white  ENT: Mucous membranes moist, oropharynx clear  PULMONARY: Normal effort, clear to auscultation  CARDIOVASCULAR: No murmurs, clicks or rubs, peripheral pulses 2+  ABDOMINAL: Soft, non-tender, no guarding or rigidity present, no pulsatile masses  BACK: no midline tenderness, no costovertebral tenderness  NEUROLOGICAL: Grossly non-focal neurological examination, speech normal, gait normal  EXTREMITIES: No edema, normal to inspection.  SKIN: Warm and dry.  PSYCHIATRIC: Affect is appropriate.  RECTAL: Brown stool, appropriate tone, sensation, 2 external hemorrhoids at the 3 o'clock position there was a pea sized clot with no active bleeding.     DIAGNOSTIC STUDIES / PROCEDURES    LABS  Labs Reviewed   CBC WITHOUT DIFFERENTIAL   HOLD BLOOD BANK SPECIMEN (NOT TESTED)     All labs reviewed by me.     COURSE & MEDICAL DECISION MAKING    ED COURSE:    INTERVENTIONS BY ME:  Medications   tranexamic acid (CYKLOKAPRON) 1000 MG/10ML for nasal packing 300 mg (300 mg Nasal Given by Provider 2/25/25 0033)     Response on recheck:    12:11 AM - Patient seen and examined at bedside. This is an evaluation of a 34 y.o. man who presents with rectal bleeding following a hemorrhoid surgery on 2/20/25.     12:29 AM - Rectal examination performed at this time as noted above. Placed surgicel soaked in TXA. Paged his surgery.     1:03 AM - I discussed the patient's case and the above findings with Dr. Rose (Surgery) who recommended I call [facility].      1:47 AM - I discussed the patient's case and the above findings with Dr. Johnson (Surgery) who recommended follow-up outpatient follow-up and medication with laxative and SITZ baths.     1:50 AM - The patient was reevaluated  at bedside. The patient has not had any more bleeding. Normal vital signs. The patient will follow-up with Dr. Meza and be prescribed colace. I then informed the patient of my plan for discharge, which includes strict return precautions for any new or worsening symptoms. Patient understands and verbalizes agreement to plan of care. Patient is comfortable going home at this time.      INITIAL ASSESSMENT, COURSE AND PLAN  Care Narrative:     The patient is a 34-year-old male presenting with rectal bleeding following recent hemorrhoidectomy. He describes passing clots with bowel movement urges but no true stool passage since surgery. Exam revealed external hemorrhoids with a small clot but no active bleeding. TXA was applied locally, and surgical consultation was obtained. The patient stabilized without further bleeding, and surgery recommended outpatient follow-up, laxatives, and sitz baths. He was discharged in stable condition with instructions to return for worsening symptoms, heavy bleeding, or persistent constipation. He will follow up with his surgeon and was prescribed docusate for bowel regulation.    ADDITIONAL PROBLEM LIST      DISPOSITION AND DISCUSSIONS  Discussion of management with other Hospitals in [place] or appropriate source(s): None    I have discussed management of the patient with the following physicians and SAVANNAH's:  Dr. Rose (Surgery), Dr. Johnson (Surgery)    Barriers to care at this time, including but not limited to:  None known .     Decision tools and prescription drugs considered including, but not limited to: colace.     The patient will return for new or worsening symptoms and is stable at the time of discharge.    The patient is referred to a primary physician for blood pressure management, diabetic screening, and for all other preventative health concerns.    DISPOSITION:  Patient will be discharged home in stable condition.    FOLLOW UP:  No follow-up provider specified.    OUTPATIENT  MEDICATIONS:  Discharge Medication List as of 2/25/2025  2:32 AM        START taking these medications    Details   docusate sodium (COLACE) 100 MG Cap Take 1 Capsule by mouth 2 times a day for 15 days., Disp-30 Capsule, R-0, Normal             FINAL DIAGNOSIS  1. Rectal bleeding    2. Constipation, unspecified constipation type      I, Anson Walls ([place]), am scribing for, and in the presence of, Virgil Sifuentes.    Electronically signed by: Anson Walls (Arlen), 2/25/2025    IVirgil personally performed the services described in this documentation, as scribed by Anson Walls in my presence, and it is both accurate and complete.     Electronically signed by: Virgil Sifuentes DO ,4:07 AM 02/25/25